# Patient Record
Sex: FEMALE | Race: WHITE | NOT HISPANIC OR LATINO | Employment: FULL TIME | URBAN - METROPOLITAN AREA
[De-identification: names, ages, dates, MRNs, and addresses within clinical notes are randomized per-mention and may not be internally consistent; named-entity substitution may affect disease eponyms.]

---

## 2017-04-05 ENCOUNTER — TELEPHONE (OUTPATIENT)
Dept: INTERNAL MEDICINE | Facility: CLINIC | Age: 22
End: 2017-04-05

## 2017-04-05 ENCOUNTER — OFFICE VISIT (OUTPATIENT)
Dept: INTERNAL MEDICINE | Facility: CLINIC | Age: 22
End: 2017-04-05
Payer: COMMERCIAL

## 2017-04-05 ENCOUNTER — LAB VISIT (OUTPATIENT)
Dept: LAB | Facility: HOSPITAL | Age: 22
End: 2017-04-05
Attending: INTERNAL MEDICINE
Payer: COMMERCIAL

## 2017-04-05 VITALS
OXYGEN SATURATION: 98 % | TEMPERATURE: 96 F | HEIGHT: 67 IN | DIASTOLIC BLOOD PRESSURE: 76 MMHG | HEART RATE: 52 BPM | SYSTOLIC BLOOD PRESSURE: 112 MMHG | WEIGHT: 150.81 LBS | BODY MASS INDEX: 23.67 KG/M2

## 2017-04-05 DIAGNOSIS — R42 DIZZINESS: ICD-10-CM

## 2017-04-05 DIAGNOSIS — R53.83 MALAISE AND FATIGUE: ICD-10-CM

## 2017-04-05 DIAGNOSIS — F32.A ANXIETY AND DEPRESSION: ICD-10-CM

## 2017-04-05 DIAGNOSIS — R53.81 MALAISE AND FATIGUE: ICD-10-CM

## 2017-04-05 DIAGNOSIS — Z00.00 ENCOUNTER FOR PREVENTIVE HEALTH EXAMINATION: Primary | ICD-10-CM

## 2017-04-05 DIAGNOSIS — F41.9 ANXIETY AND DEPRESSION: ICD-10-CM

## 2017-04-05 DIAGNOSIS — Z00.00 ENCOUNTER FOR PREVENTIVE HEALTH EXAMINATION: ICD-10-CM

## 2017-04-05 PROCEDURE — 36415 COLL VENOUS BLD VENIPUNCTURE: CPT | Mod: PO

## 2017-04-05 PROCEDURE — 80053 COMPREHEN METABOLIC PANEL: CPT

## 2017-04-05 PROCEDURE — 84443 ASSAY THYROID STIM HORMONE: CPT

## 2017-04-05 PROCEDURE — 83036 HEMOGLOBIN GLYCOSYLATED A1C: CPT

## 2017-04-05 PROCEDURE — 99385 PREV VISIT NEW AGE 18-39: CPT | Mod: S$GLB,,, | Performed by: INTERNAL MEDICINE

## 2017-04-05 PROCEDURE — 99999 PR PBB SHADOW E&M-NEW PATIENT-LVL III: CPT | Mod: PBBFAC,,, | Performed by: INTERNAL MEDICINE

## 2017-04-05 PROCEDURE — 80061 LIPID PANEL: CPT

## 2017-04-05 PROCEDURE — 85025 COMPLETE CBC W/AUTO DIFF WBC: CPT

## 2017-04-05 PROCEDURE — 82306 VITAMIN D 25 HYDROXY: CPT

## 2017-04-05 RX ORDER — FLUOXETINE HYDROCHLORIDE 20 MG/1
20 CAPSULE ORAL DAILY
Qty: 30 CAPSULE | Refills: 6 | Status: SHIPPED | OUTPATIENT
Start: 2017-04-05 | End: 2017-05-23 | Stop reason: SDUPTHER

## 2017-04-05 NOTE — PROGRESS NOTES
"Subjective:       Patient ID: Alea Cooley is a 22 y.o. female.    Chief Complaint: Establish Care    HPI Comments: Here to establish care.  Used to take prozac and klonopin for anxiety/depression.  Feels fatigued lately.  Sometimes feels dizzy and tired after eating.  Walking regularly.  No f/c/sw/cough.  No cp/palp.  Sob attrib to anxiety.  BMs sometimes slow, occas hard.  Urine normal.  Not taking vit D.  Menses regular, not excessively heavy.    SH:  No tob, occas Et[employer], single,  at [employer].    FH:  Mom skin cancer, gparents with DM.    HM:  5/11 TDaP.        Review of Systems   Constitutional: Positive for fatigue. Negative for appetite change, chills, diaphoresis and fever.   HENT: Negative for congestion, ear pain, rhinorrhea, sinus pressure and sore throat.    Respiratory: Negative for cough, chest tightness and shortness of breath.    Cardiovascular: Negative for chest pain and palpitations.   Gastrointestinal: Negative for blood in stool, constipation, diarrhea, nausea and vomiting.   Genitourinary: Negative for dysuria, frequency, hematuria, menstrual problem, urgency and vaginal discharge.   Musculoskeletal: Negative for arthralgias.   Skin: Negative for rash.   Neurological: Positive for dizziness. Negative for headaches.   Psychiatric/Behavioral: Negative for sleep disturbance. The patient is nervous/anxious.        Objective:   /76 (BP Location: Right arm)  Pulse (!) 52  Temp (!) 95.6 °F (35.3 °C) (Tympanic)   Ht 5' 7" (1.702 m)  Wt 68.4 kg (150 lb 12.7 oz)  SpO2 98%  BMI 23.62 kg/m2    Physical Exam   Constitutional: She is oriented to person, place, and time. She appears well-developed and well-nourished.   HENT:   Right Ear: External ear normal. Tympanic membrane is not injected.   Left Ear: External ear normal. Tympanic membrane is not injected.   Mouth/Throat: Oropharynx is clear and moist.   Eyes: Conjunctivae are normal.   Neck: Normal range of motion. Neck supple. No " thyromegaly present.   Cardiovascular: Normal rate, regular rhythm and intact distal pulses.  Exam reveals no gallop and no friction rub.    No murmur heard.  Pulmonary/Chest: Effort normal and breath sounds normal. She has no wheezes. She has no rales.   Abdominal: Soft. Bowel sounds are normal. She exhibits no mass. There is no tenderness.   Musculoskeletal: She exhibits no edema.   Lymphadenopathy:     She has no cervical adenopathy.   Neurological: She is alert and oriented to person, place, and time.   Skin: Skin is warm. No rash noted.   Psychiatric: She has a normal mood and affect.       Assessment:       1. Encounter for preventive health examination    2. Anxiety and depression    3. Malaise and fatigue    4. Dizziness        Plan:       Alea was seen today for establish care.    Diagnoses and all orders for this visit:    Encounter for preventive health examination- labs with DC.  -     Comprehensive metabolic panel; Future  -     Lipid panel; Future  -     CBC auto differential; Future  -     TSH; Future  -     Vitamin D; Future  -     Hemoglobin A1c; Future    Anxiety and depression- start rx.  -     fluoxetine (PROZAC) 20 MG capsule; Take 1 capsule (20 mg total) by mouth once daily.    Malaise and fatigue/Dizziness- likely 2ary to above.    RTC 6 weeks.

## 2017-04-05 NOTE — TELEPHONE ENCOUNTER
----- Message from Maliha Jeong sent at 4/5/2017  3:05 PM CDT -----  Contact: pt  Returning missed call - please call again - she will only be available for 10 min's and then not until after 4:20

## 2017-04-05 NOTE — MR AVS SNAPSHOT
[facility] Medicine  [address]  [place] [address]  Phone: [phone]  Fax: [phone]                  Alea Cooley   2017 5:00 PM   Office Visit    Description:  Female : 1995   Provider:  Alisha Ramirez MD   Department:  [facility] Internal Medicine           Reason for Visit     Establish Care           Diagnoses this Visit        Comments    Encounter for preventive health examination    -  Primary     Anxiety and depression         Malaise and fatigue         Dizziness                To Do List           Future Appointments        Provider Department Dept Phone    2017 6:30 PM LAB, SAME DAY [facility] [phone]    2017 4:40 PM Alisha Ramirez MD [facility] [phone]      Goals (5 Years of Data)     None      Follow-Up and Disposition     Return in about 6 weeks (around 2017).    Follow-up and Disposition History       These Medications        Disp Refills Start End    fluoxetine (PROZAC) 20 MG capsule 30 capsule 6 2017    Take 1 capsule (20 mg total) by mouth once daily. - Oral    Pharmacy: [address] #: [phone]         [facility]     [facility] On [facility] Nurse Care Line -  Assistance  Unless otherwise directed by your provider, please contact [facility], our nurse care line that is available for  assistance.     Registered nurses in the [facility] Center provide: appointment scheduling, clinical advisement, health education, and other advisory services.  Call: [phone] (toll free)               Medications           START taking these NEW medications        Refills    fluoxetine (PROZAC) 20 MG capsule 6    Sig: Take 1 capsule (20 mg total) by mouth once daily.    Class: Normal    Route: Oral           Verify that the below list of medications is an accurate representation of the medications you  "are currently taking.  If none reported, the list may be blank. If incorrect, please contact your healthcare provider. Carry this list with you in case of emergency.           Current Medications     multivitamin capsule Take 1 capsule by mouth once daily.    fluoxetine (PROZAC) 20 MG capsule Take 1 capsule (20 mg total) by mouth once daily.           Clinical Reference Information           Your Vitals Were     BP Pulse Temp Height Weight SpO2    112/76 (BP Location: Right arm) 52 95.6 °F (35.3 °C) (Tympanic) 5' 7" (1.702 m) 68.4 kg (150 lb 12.7 oz) 98%    BMI                23.62 kg/m2          Blood Pressure          Most Recent Value    BP  112/76      Allergies as of 4/5/2017     No Known Allergies      Immunizations Administered on Date of Encounter - 4/5/2017     None      Orders Placed During Today's Visit     Future Labs/Procedures Expected by Expires    CBC auto differential  4/5/2017 4/5/2018    Comprehensive metabolic panel  4/5/2017 4/5/2018    Hemoglobin A1c  4/5/2017 6/4/2018    Lipid panel  4/5/2017 4/5/2018    TSH  4/5/2017 4/5/2018    Vitamin D  As directed 4/5/2018      Language Assistance Services     ATTENTION: Language assistance services are available, free of charge. Please call [phone].      ATENCIÓN: Si javi waleska, tiene a  disposición servicios gratuitos de asistencia lingüística. Llame al [phone].     [facility] Ý: N?u b?n nói Ti?ng Vi?t, có các d?ch v? h? tr? ngôn ng? mi?n phí dành cho b?n. G?i s? [phone].         [facility] - Internal [facility] complies with applicable Federal civil rights laws and does not discriminate on the basis of race, color, national origin, age, disability, or sex.        "

## 2017-04-06 LAB
25(OH)D3+25(OH)D2 SERPL-MCNC: 32 NG/ML
ALBUMIN SERPL BCP-MCNC: 4 G/DL
ALP SERPL-CCNC: 39 U/L
ALT SERPL W/O P-5'-P-CCNC: 12 U/L
ANION GAP SERPL CALC-SCNC: 8 MMOL/L
AST SERPL-CCNC: 23 U/L
BASOPHILS # BLD AUTO: 0.01 K/UL
BASOPHILS NFR BLD: 0.2 %
BILIRUB SERPL-MCNC: 0.3 MG/DL
BUN SERPL-MCNC: 16 MG/DL
CALCIUM SERPL-MCNC: 9.6 MG/DL
CHLORIDE SERPL-SCNC: 105 MMOL/L
CHOLEST/HDLC SERPL: 3.4 {RATIO}
CO2 SERPL-SCNC: 25 MMOL/L
CREAT SERPL-MCNC: 0.8 MG/DL
DIFFERENTIAL METHOD: NORMAL
EOSINOPHIL # BLD AUTO: 0.1 K/UL
EOSINOPHIL NFR BLD: 1.1 %
ERYTHROCYTE [DISTWIDTH] IN BLOOD BY AUTOMATED COUNT: 13.6 %
EST. GFR  (AFRICAN AMERICAN): >60 ML/MIN/1.73 M^2
EST. GFR  (NON AFRICAN AMERICAN): >60 ML/MIN/1.73 M^2
GLUCOSE SERPL-MCNC: 72 MG/DL
HCT VFR BLD AUTO: 37 %
HDL/CHOLESTEROL RATIO: 29.7 %
HDLC SERPL-MCNC: 175 MG/DL
HDLC SERPL-MCNC: 52 MG/DL
HGB BLD-MCNC: 12.3 G/DL
LDLC SERPL CALC-MCNC: 112 MG/DL
LYMPHOCYTES # BLD AUTO: 2.5 K/UL
LYMPHOCYTES NFR BLD: 47.5 %
MCH RBC QN AUTO: 27.5 PG
MCHC RBC AUTO-ENTMCNC: 33.2 %
MCV RBC AUTO: 83 FL
MONOCYTES # BLD AUTO: 0.4 K/UL
MONOCYTES NFR BLD: 7.6 %
NEUTROPHILS # BLD AUTO: 2.3 K/UL
NEUTROPHILS NFR BLD: 43.4 %
NONHDLC SERPL-MCNC: 123 MG/DL
PLATELET # BLD AUTO: 203 K/UL
PMV BLD AUTO: 11.9 FL
POTASSIUM SERPL-SCNC: 4.1 MMOL/L
PROT SERPL-MCNC: 7.3 G/DL
RBC # BLD AUTO: 4.47 M/UL
SODIUM SERPL-SCNC: 138 MMOL/L
TRIGL SERPL-MCNC: 55 MG/DL
TSH SERPL DL<=0.005 MIU/L-ACNC: 0.61 UIU/ML
WBC # BLD AUTO: 5.28 K/UL

## 2017-04-07 LAB
ESTIMATED AVG GLUCOSE: 103 MG/DL
HBA1C MFR BLD HPLC: 5.2 %

## 2017-04-20 ENCOUNTER — TELEPHONE (OUTPATIENT)
Dept: INTERNAL MEDICINE | Facility: CLINIC | Age: 22
End: 2017-04-20

## 2017-04-20 NOTE — TELEPHONE ENCOUNTER
----- Message from Nicole Villatoro sent at 4/20/2017  3:50 PM CDT -----  Contact: pt   States she needs a copy of her tetanus shot records faxed to [phone] pt's atten on it and can be reached at [phone]//thanks/dbw

## 2017-05-23 ENCOUNTER — OFFICE VISIT (OUTPATIENT)
Dept: INTERNAL MEDICINE | Facility: CLINIC | Age: 22
End: 2017-05-23
Payer: COMMERCIAL

## 2017-05-23 VITALS
BODY MASS INDEX: 22.98 KG/M2 | HEIGHT: 67 IN | DIASTOLIC BLOOD PRESSURE: 62 MMHG | TEMPERATURE: 97 F | HEART RATE: 75 BPM | SYSTOLIC BLOOD PRESSURE: 114 MMHG | WEIGHT: 146.38 LBS

## 2017-05-23 DIAGNOSIS — F41.9 ANXIETY AND DEPRESSION: Primary | ICD-10-CM

## 2017-05-23 DIAGNOSIS — R53.83 MALAISE AND FATIGUE: ICD-10-CM

## 2017-05-23 DIAGNOSIS — Z00.00 ENCOUNTER FOR PREVENTIVE HEALTH EXAMINATION: ICD-10-CM

## 2017-05-23 DIAGNOSIS — F32.A ANXIETY AND DEPRESSION: Primary | ICD-10-CM

## 2017-05-23 DIAGNOSIS — R53.81 MALAISE AND FATIGUE: ICD-10-CM

## 2017-05-23 PROCEDURE — 99213 OFFICE O/P EST LOW 20 MIN: CPT | Mod: S$GLB,,, | Performed by: INTERNAL MEDICINE

## 2017-05-23 PROCEDURE — 1160F RVW MEDS BY RX/DR IN RCRD: CPT | Mod: S$GLB,,, | Performed by: INTERNAL MEDICINE

## 2017-05-23 PROCEDURE — 99999 PR PBB SHADOW E&M-EST. PATIENT-LVL II: CPT | Mod: PBBFAC,,, | Performed by: INTERNAL MEDICINE

## 2017-05-23 RX ORDER — FLUOXETINE HYDROCHLORIDE 20 MG/1
20 CAPSULE ORAL DAILY
Qty: 30 CAPSULE | Refills: 6 | Status: SHIPPED | OUTPATIENT
Start: 2017-05-23 | End: 2018-07-02 | Stop reason: SDUPTHER

## 2017-05-23 NOTE — PROGRESS NOTES
"Subjective:       Patient ID: Alea Cooley is a 22 y.o. female.    Chief Complaint: Follow-up    Here for follow up of medical problems.  Doing very well on rx.  Thinks dose is good.  No more dizziness after 3-4 weeks of rx.  Walking for exercise.  Sleeping well.  Energy much better now.    Updated/ annual due 4/18:  HM:  5/11 TDaP.          Review of Systems   Constitutional: Negative for chills, diaphoresis and fever.   Respiratory: Negative for cough and shortness of breath.    Cardiovascular: Negative for chest pain, palpitations and leg swelling.   Gastrointestinal: Negative for blood in stool, constipation, diarrhea, nausea and vomiting.   Genitourinary: Negative for dysuria, frequency and hematuria.   Psychiatric/Behavioral: The patient is not nervous/anxious.        Objective:   /62   Pulse 75   Temp 96.9 °F (36.1 °C) (Tympanic)   Ht 5' 7" (1.702 m)   Wt 66.4 kg (146 lb 6.2 oz)   BMI 22.93 kg/m²     Physical Exam   Constitutional: She is oriented to person, place, and time. She appears well-developed.   HENT:   Mouth/Throat: Oropharynx is clear and moist.   Neck: Neck supple. Carotid bruit is not present. No thyroid mass present.   Cardiovascular: Normal rate, regular rhythm and intact distal pulses.  Exam reveals no gallop and no friction rub.    No murmur heard.  Pulmonary/Chest: Effort normal and breath sounds normal. She has no wheezes. She has no rales.   Abdominal: Soft. Bowel sounds are normal. She exhibits no mass. There is no hepatosplenomegaly. There is no tenderness.   Musculoskeletal: She exhibits no edema.   Lymphadenopathy:     She has no cervical adenopathy.   Neurological: She is alert and oriented to person, place, and time.   Psychiatric: She has a normal mood and affect.       Assessment:       1. Anxiety and depression        Plan:       Alea was seen today for follow-up.    Diagnoses and all orders for this visit:    Anxiety and depression- cont rx.  RTC 4mo.           "

## 2017-07-14 ENCOUNTER — PATIENT MESSAGE (OUTPATIENT)
Dept: INTERNAL MEDICINE | Facility: CLINIC | Age: 22
End: 2017-07-14

## 2017-07-31 ENCOUNTER — TELEPHONE (OUTPATIENT)
Dept: INTERNAL MEDICINE | Facility: CLINIC | Age: 22
End: 2017-07-31

## 2017-07-31 NOTE — TELEPHONE ENCOUNTER
Patient called and stated that she was feeling suicidal. I asked her where she was and she stated she was at work. She works at [employer] and she was in the office with her supervisor at the time. I then asked her how long has she been feeling this way and she said since yesterday. She says that she just doesn't want to be here anymore. She stated she was in the office with her supervisor and then put her on the phone. Her supervisor Bessie stated that she came into her office saying that she was having thoughts of suicide and she thought to call Dr Ramirez's office. I told her that she should not leave her alone at any point. She said that she was sitting in the office with her. I told her that she needs to go to [facility] and see the [facility] team to help her. The supervisor suggested that she bring herself. Told her not to let her leave by herself or let her drive. I asked if anyone can bring her from the office and she stated no. I then told her I can call EMS to bring her and she said the patient did not want that nor did she. Rosanna was with me while I was on the call with this patient and she looked in her chart and called her mom Ms Montelongo. Rosanna explained what was going on and asked if she will go and  there daughter and bring her to the [facility] ER. Mom said that she would and I relayed that message to Bessie and also gave her Alea's mom's cell phone number so that she can call her and give directions to exactly where she is. Bessie stated she will call the mom and stay with her until she arrives to bring her to the ER.     Very good advice and thank you.  SM

## 2017-10-17 ENCOUNTER — OFFICE VISIT (OUTPATIENT)
Dept: INTERNAL MEDICINE | Facility: CLINIC | Age: 22
End: 2017-10-17
Payer: COMMERCIAL

## 2017-10-17 VITALS
OXYGEN SATURATION: 98 % | WEIGHT: 148.5 LBS | BODY MASS INDEX: 23.31 KG/M2 | HEIGHT: 67 IN | SYSTOLIC BLOOD PRESSURE: 114 MMHG | DIASTOLIC BLOOD PRESSURE: 66 MMHG | TEMPERATURE: 97 F | HEART RATE: 53 BPM

## 2017-10-17 DIAGNOSIS — J32.9 SINUSITIS, UNSPECIFIED CHRONICITY, UNSPECIFIED LOCATION: ICD-10-CM

## 2017-10-17 DIAGNOSIS — R51.9 SINUS HEADACHE: Primary | ICD-10-CM

## 2017-10-17 PROCEDURE — 99214 OFFICE O/P EST MOD 30 MIN: CPT | Mod: S$GLB,,, | Performed by: NURSE PRACTITIONER

## 2017-10-17 PROCEDURE — 99999 PR PBB SHADOW E&M-EST. PATIENT-LVL III: CPT | Mod: PBBFAC,,, | Performed by: NURSE PRACTITIONER

## 2017-10-17 RX ORDER — NAPROXEN SODIUM 220 MG
220 TABLET ORAL
COMMUNITY
End: 2018-01-22

## 2017-10-17 RX ORDER — AZITHROMYCIN 250 MG/1
250 TABLET, FILM COATED ORAL DAILY
Qty: 6 TABLET | Refills: 0 | Status: SHIPPED | OUTPATIENT
Start: 2017-10-17 | End: 2017-10-22

## 2017-10-17 RX ORDER — CETIRIZINE HYDROCHLORIDE 10 MG/1
10 TABLET ORAL DAILY
Qty: 14 TABLET | Refills: 0 | Status: SHIPPED | OUTPATIENT
Start: 2017-10-17 | End: 2018-01-22

## 2017-10-17 NOTE — LETTER
October 17, 2017               [facility] - Internal Medicine  Internal Medicine  [address]  [place] [address]  Phone: [phone]  Fax: [phone]   October 17, 2017     Patient: Alea Cooley   YOB: 1995   Date of Visit: 10/17/2017       To Whom it May Concern:    Alea Cooley was seen in my clinic on 10/17/2017. She may return to work on 10/18/2017.    If you have any questions or concerns, please don't hesitate to call.    Sincerely,         Klaudia Mayes NP

## 2017-10-19 NOTE — PROGRESS NOTES
"CHIEF COMPLAINT/REASON FOR VISIT: nasal congestion, post nasal drip, sore throat, facial pressure    HISTORY OF PRESENT ILLNESS:    Alea Cooley.  22 y.o.  female complains of a sinus issue with nasal congestion, headache, facial pressure, bilateral ear fullness onset 13 days (1 1/2 week) ago. Patient admits tried OTC medications with little relief. Reports having sinus congestion about one week ago.      PAST MEDICAL HISTORY:   Past Medical History:   Diagnosis Date    Anxiety and depression          PAST SURGICAL HISTORY:.History reviewed. No pertinent surgical history.      SOCIAL HISTORY:.  Social History     Social History    Marital status: Single     Spouse name: N/A    Number of children: N/A    Years of education: N/A     Occupational History    Not on file.     Social History Main Topics    Smoking status: Never Smoker    Smokeless tobacco: Never Used    Alcohol use Yes    Drug use: Unknown    Sexual activity: Not on file     Other Topics Concern    Not on file     Social History Narrative    No narrative on file       ROS:  GENERAL: Reports no fever, chills.  SKIN: No rashes, itching or changes in color or texture of skin.  HEENT: Reports sinus pressure, nasal congestion, ear discomfort, rhinorrhea.  CHEST: Denies cough and sputum production.  Denies shortness of breath and wheezing.  CARDIOVASCULAR: Denies chest pain, PND, orthopnea or reduced exercise tolerance.  ABDOMEN: Appetite fine. No weight loss.   MUSCULOSKELETAL: No joint stiffness, pain or swelling. Denies back pain.  NEUROLOGIC: Report headaches. No history of seizures, paralysis, alteration of gait or coordination.  PSYCHIATRIC: Denies mood swings, depression or suicidal thoughts.    /66 (BP Location: Left arm, Patient Position: Sitting, BP Method: Medium (Manual))   Pulse (!) 53   Temp 97.2 °F (36.2 °C) (Tympanic)   Ht 5' 7" (1.702 m)   Wt 67.4 kg (148 lb 7.7 oz)   LMP 10/17/2017 (Exact Date)   SpO2 98%   BMI 23.26 " kg/m² .    PE:   APPEARANCE: Well nourished, well developed, in mild distress.   SKIN: Normal skin turgor, no lesions.  HEENT: Turbinates red and enlarge, sinus tenderness on palpation, normal pharynx, TMs poor light reflex bilaterally.  CHEST: Lungs clear to auscultation. no wheezing  CARDIOVASCULAR: Regular rate and rhythm.  MUSCULOSKELETAL: Motor: 5/5 strength major flexors/extensors.    PLAN:   Advise Hydrate and rest.    Normal saline nasal wash  to irrigate sinuses and for congestion/runny nose.  Practice good handwashing.  Mucinex for cough and chest congestion.  Tylenol or Ibuprofen for fever, headache and body aches      DIAGNOSIS:  Sinus headache  -     azithromycin (ZITHROMAX Z-MARIO) 250 MG tablet; Take 1 tablet (250 mg total) by mouth once daily. Take 2 tabs today then one a day  Dispense: 6 tablet; Refill: 0  -     cetirizine (ZYRTEC) 10 MG tablet; Take 1 tablet (10 mg total) by mouth once daily.  Dispense: 14 tablet; Refill: 0    Sinusitis, unspecified chronicity, unspecified location  -     azithromycin (ZITHROMAX Z-MARIO) 250 MG tablet; Take 1 tablet (250 mg total) by mouth once daily. Take 2 tabs today then one a day  Dispense: 6 tablet; Refill: 0  -     cetirizine (ZYRTEC) 10 MG tablet; Take 1 tablet (10 mg total) by mouth once daily.  Dispense: 14 tablet; Refill: 0        Instructed to take all medications as ordered.  Informed if no improvement or symptoms worsens to return to clinic.  If not, follow up as scheduled.  Informed to hydrate and rest.  Printed and review after visit summary with patient.

## 2018-01-22 ENCOUNTER — OFFICE VISIT (OUTPATIENT)
Dept: INTERNAL MEDICINE | Facility: CLINIC | Age: 23
End: 2018-01-22
Payer: COMMERCIAL

## 2018-01-22 ENCOUNTER — LAB VISIT (OUTPATIENT)
Dept: LAB | Facility: HOSPITAL | Age: 23
End: 2018-01-22
Attending: PHYSICIAN ASSISTANT
Payer: COMMERCIAL

## 2018-01-22 VITALS
BODY MASS INDEX: 23.43 KG/M2 | WEIGHT: 149.25 LBS | TEMPERATURE: 99 F | HEART RATE: 68 BPM | SYSTOLIC BLOOD PRESSURE: 110 MMHG | OXYGEN SATURATION: 98 % | DIASTOLIC BLOOD PRESSURE: 76 MMHG | HEIGHT: 67 IN

## 2018-01-22 DIAGNOSIS — Z12.4 PAP SMEAR FOR CERVICAL CANCER SCREENING: ICD-10-CM

## 2018-01-22 DIAGNOSIS — R10.2 SUPRAPUBIC PAIN: ICD-10-CM

## 2018-01-22 DIAGNOSIS — R10.2 SUPRAPUBIC PAIN: Primary | ICD-10-CM

## 2018-01-22 DIAGNOSIS — N30.00 ACUTE CYSTITIS WITHOUT HEMATURIA: Primary | ICD-10-CM

## 2018-01-22 LAB
AMORPH CRY URNS QL MICRO: ABNORMAL
BILIRUB UR QL STRIP: NEGATIVE
CLARITY UR: ABNORMAL
COLOR UR: YELLOW
GLUCOSE UR QL STRIP: NEGATIVE
HGB UR QL STRIP: NEGATIVE
KETONES UR QL STRIP: NEGATIVE
LEUKOCYTE ESTERASE UR QL STRIP: NEGATIVE
MICROSCOPIC COMMENT: ABNORMAL
NITRITE UR QL STRIP: NEGATIVE
PH UR STRIP: 7 [PH] (ref 5–8)
PROT UR QL STRIP: NEGATIVE
SP GR UR STRIP: 1.01 (ref 1–1.03)
SQUAMOUS #/AREA URNS HPF: 6 /HPF
URN SPEC COLLECT METH UR: ABNORMAL

## 2018-01-22 PROCEDURE — 99999 PR PBB SHADOW E&M-EST. PATIENT-LVL IV: CPT | Mod: PBBFAC,,, | Performed by: PHYSICIAN ASSISTANT

## 2018-01-22 PROCEDURE — 87086 URINE CULTURE/COLONY COUNT: CPT

## 2018-01-22 PROCEDURE — 81000 URINALYSIS NONAUTO W/SCOPE: CPT | Mod: PO

## 2018-01-22 PROCEDURE — 99213 OFFICE O/P EST LOW 20 MIN: CPT | Mod: S$GLB,,, | Performed by: PHYSICIAN ASSISTANT

## 2018-01-22 RX ORDER — NITROFURANTOIN 25; 75 MG/1; MG/1
100 CAPSULE ORAL 2 TIMES DAILY
Qty: 14 CAPSULE | Refills: 0 | Status: SHIPPED | OUTPATIENT
Start: 2018-01-22 | End: 2018-01-29

## 2018-01-22 RX ORDER — PHENAZOPYRIDINE HYDROCHLORIDE 100 MG/1
100 TABLET, FILM COATED ORAL 3 TIMES DAILY PRN
Qty: 15 TABLET | Refills: 0 | Status: SHIPPED | OUTPATIENT
Start: 2018-01-22 | End: 2018-01-27

## 2018-01-22 NOTE — PROGRESS NOTES
Subjective:      Patient ID: Alea Cooley is a 22 y.o. female.    Chief Complaint: Pelvic Pain and Urinary Tract Infection    LMP: 12/24/2017.       Abdominal Pain   This is a recurrent problem. The current episode started more than 1 month ago. The onset quality is sudden. The problem occurs intermittently. The most recent episode lasted 3 hours. The problem has been gradually worsening. The pain is located in the suprapubic region. The pain is at a severity of 7/10. The pain is mild. The quality of the pain is cramping. Associated symptoms include constipation, diarrhea, flatus, frequency, headaches, myalgias and nausea. Pertinent negatives include no anorexia, arthralgias, belching, dysuria, fever, hematochezia, hematuria, melena, vomiting or weight loss. The pain is aggravated by drinking alcohol (sexual intercourse). The pain is relieved by certain positions. She has tried nothing for the symptoms. The treatment provided mild relief. There is no history of abdominal surgery, colon cancer, Crohn's disease, gallstones, GERD, irritable bowel syndrome, pancreatitis, PUD or ulcerative colitis. Patient's medical history includes UTI. Patient's medical history does not include kidney stones.   Has never seen GYN. Will get established today.    Review of Systems   Constitutional: Negative for activity change, appetite change, chills, diaphoresis, fatigue, fever, unexpected weight change and weight loss.   HENT: Negative.  Negative for congestion, hearing loss, postnasal drip, rhinorrhea, sore throat, trouble swallowing and voice change.    Eyes: Negative.  Negative for visual disturbance.   Respiratory: Negative.  Negative for cough, choking, chest tightness and shortness of breath.    Cardiovascular: Negative for chest pain, palpitations and leg swelling.   Gastrointestinal: Positive for abdominal pain, constipation, diarrhea, flatus and nausea. Negative for abdominal distention, anorexia, blood in stool,  "hematochezia, melena and vomiting.   Endocrine: Negative for cold intolerance, heat intolerance, polydipsia and polyuria.   Genitourinary: Positive for frequency. Negative for difficulty urinating, dysuria and hematuria.   Musculoskeletal: Positive for myalgias. Negative for arthralgias, back pain, gait problem and joint swelling.   Skin: Negative for color change, pallor, rash and wound.   Neurological: Positive for headaches. Negative for dizziness, tremors, weakness, light-headedness and numbness.   Hematological: Negative for adenopathy.   Psychiatric/Behavioral: Negative for behavioral problems, confusion, self-injury, sleep disturbance and suicidal ideas. The patient is not nervous/anxious.      Objective:   /76   Pulse 68   Temp 98.5 °F (36.9 °C) (Tympanic)   Ht 5' 7" (1.702 m)   Wt 67.7 kg (149 lb 4 oz)   LMP 12/24/2017 (Exact Date)   SpO2 98%   BMI 23.38 kg/m²     Physical Exam   Constitutional: She is oriented to person, place, and time. She appears well-developed and well-nourished.   HENT:   Head: Normocephalic and atraumatic.   Right Ear: External ear normal.   Left Ear: External ear normal.   Nose: Nose normal.   Mouth/Throat: Oropharynx is clear and moist.   Eyes: Conjunctivae and EOM are normal. Pupils are equal, round, and reactive to light.   Neck: Normal range of motion.   Cardiovascular: Normal rate, regular rhythm and normal heart sounds.  Exam reveals no gallop and no friction rub.    No murmur heard.  Pulmonary/Chest: Effort normal and breath sounds normal. No respiratory distress. She has no wheezes. She has no rales. She exhibits no tenderness.   Abdominal: Soft. Bowel sounds are normal. She exhibits no distension, no pulsatile liver, no fluid wave, no ascites, no pulsatile midline mass and no mass. There is no hepatosplenomegaly, splenomegaly or hepatomegaly. There is tenderness in the suprapubic area. There is no rigidity, no rebound, no guarding and no CVA tenderness. "   Musculoskeletal: Normal range of motion.   Neurological: She is alert and oriented to person, place, and time.   Skin: Skin is warm. No rash noted.   Psychiatric: She has a normal mood and affect. Her behavior is normal. Judgment and thought content normal.   Vitals reviewed.      Assessment:     1. Suprapubic pain    2. Pap smear for cervical cancer screening      Plan:   Suprapubic pain  -     Urinalysis; Future; Expected date: 01/22/2018  -     Urine culture; Future  -drink at least 100 ounces of water daily. Reduce sugary beverages.   -fiber to prevent constipation. Other prevention tips discussed. Handout provided.     Pap smear for cervical cancer screening  -     Ambulatory referral to Gynecology    Follow-up if symptoms worsen or fail to improve.

## 2018-01-22 NOTE — PATIENT INSTRUCTIONS

## 2018-01-24 LAB — BACTERIA UR CULT: NORMAL

## 2018-01-26 ENCOUNTER — OFFICE VISIT (OUTPATIENT)
Dept: OBSTETRICS AND GYNECOLOGY | Facility: CLINIC | Age: 23
End: 2018-01-26
Payer: COMMERCIAL

## 2018-01-26 VITALS
BODY MASS INDEX: 23.98 KG/M2 | DIASTOLIC BLOOD PRESSURE: 60 MMHG | HEIGHT: 67 IN | WEIGHT: 152.75 LBS | SYSTOLIC BLOOD PRESSURE: 108 MMHG

## 2018-01-26 DIAGNOSIS — N92.6 IRREGULAR MENSES: ICD-10-CM

## 2018-01-26 DIAGNOSIS — Z30.011 ENCOUNTER FOR INITIAL PRESCRIPTION OF CONTRACEPTIVE PILLS: ICD-10-CM

## 2018-01-26 DIAGNOSIS — N94.10 DYSPAREUNIA, FEMALE: ICD-10-CM

## 2018-01-26 DIAGNOSIS — N39.0 FREQUENT UTI: ICD-10-CM

## 2018-01-26 DIAGNOSIS — Z01.419 ENCOUNTER FOR GYNECOLOGICAL EXAMINATION WITHOUT ABNORMAL FINDING: Primary | ICD-10-CM

## 2018-01-26 PROCEDURE — 99385 PREV VISIT NEW AGE 18-39: CPT | Mod: 25,S$GLB,, | Performed by: NURSE PRACTITIONER

## 2018-01-26 PROCEDURE — 99999 PR PBB SHADOW E&M-EST. PATIENT-LVL III: CPT | Mod: PBBFAC,,, | Performed by: NURSE PRACTITIONER

## 2018-01-26 PROCEDURE — 81025 URINE PREGNANCY TEST: CPT | Mod: S$GLB,,, | Performed by: NURSE PRACTITIONER

## 2018-01-26 PROCEDURE — 87491 CHLMYD TRACH DNA AMP PROBE: CPT

## 2018-01-26 PROCEDURE — 88175 CYTOPATH C/V AUTO FLUID REDO: CPT

## 2018-01-26 RX ORDER — LEVONORGESTREL AND ETHINYL ESTRADIOL 0.1-0.02MG
1 KIT ORAL DAILY
Qty: 28 TABLET | Refills: 12 | Status: SHIPPED | OUTPATIENT
Start: 2018-01-26 | End: 2018-09-11 | Stop reason: SDUPTHER

## 2018-01-26 NOTE — PROGRESS NOTES
"CC: Well woman exam    Alea Cooley is a 22 y.o. female  presents for well woman exam.  LMP: Patient's last menstrual period was 2017..    Cycles are now coming about q 6-7 weeks - made a change about 6 mth ago.   Takes a pregnancy test to make sure negative.  Has not taken one this month.  Wants to start ocp.  She has had frequent uti's.  Feeling better with treatment.  Noted had amorphous changes in urine.  Pt eats a lot of citrus fruits.  Only urinates after intercourse.  First pap - teaching done.   Has noted some pain with intercourse in vaginal area over last few attempts.     Past Medical History:   Diagnosis Date    Anxiety and depression      History reviewed. No pertinent surgical history.  Social History     Social History    Marital status: Single     Spouse name: N/A    Number of children: N/A    Years of education: N/A     Occupational History    Not on file.     Social History Main Topics    Smoking status: Never Smoker    Smokeless tobacco: Never Used    Alcohol use Yes      Comment: occ    Drug use: No    Sexual activity: Yes     Partners: Male     Birth control/ protection: None     Other Topics Concern    Not on file     Social History Narrative    No narrative on file     Family History   Problem Relation Age of Onset    Diabetes Paternal Grandfather     Diabetes Paternal Grandmother     Diabetes Maternal Grandmother      OB History      Para Term  AB Living    0 0 0 0 0 0    SAB TAB Ectopic Multiple Live Births    0 0 0 0 0          /60   Ht 5' 7" (1.702 m)   Wt 69.3 kg (152 lb 12.5 oz)   LMP 2017   BMI 23.93 kg/m²       ROS:  GENERAL: Denies weight gain or weight loss. Feeling well overall.   SKIN: Denies rash or lesions.   HEAD: Denies head injury or headache.   NODES: Denies enlarged lymph nodes.   CHEST: Denies chest pain or shortness of breath.   CARDIOVASCULAR: Denies palpitations or left sided chest pain.   ABDOMEN: No abdominal " pain, constipation, diarrhea, nausea, vomiting or rectal bleeding.   URINARY: No frequency, dysuria, hematuria, or burning on urination.  REPRODUCTIVE: See HPI.   BREASTS: The patient performs breast self-examination and denies pain, lumps, or nipple discharge.   HEMATOLOGIC: No easy bruisability or excessive bleeding.   MUSCULOSKELETAL: Denies joint pain or swelling.   NEUROLOGIC: Denies syncope or weakness.   PSYCHIATRIC: Denies depression, anxiety or mood swings.    PHYSICAL EXAM:  APPEARANCE: Well nourished, well developed, in no acute distress.  AFFECT: WNL, alert and oriented x 3  SKIN: No acne or hirsutism  NECK: Neck symmetric without masses or thyromegaly  NODES: No inguinal, cervical, axillary, or femoral lymph node enlargement  CHEST: Good respiratory effect  ABDOMEN: Soft.  No tenderness or masses.  No hepatosplenomegaly.  No hernias.  BREASTS: Symmetrical, no skin changes or visible lesions.  No palpable masses, nipple discharge bilaterally.  PELVIC: Normal external genitalia without lesions.  Normal hair distribution.  Adequate perineal body, normal urethral meatus.  Vagina moist and well rugated without lesions or discharge.  Cervix pink, without lesions, discharge or tenderness.  No significant cystocele or rectocele.  Bimanual exam shows uterus to be normal size, regular, mobile and nontender.  Adnexa without masses or tenderness.    EXTREMITIES: No edema.  Physical Exam    1. Encounter for gynecological examination without abnormal finding  Liquid-based pap smear, screening   2. Irregular menses  POCT Urine Pregnancy   3. Encounter for initial prescription of contraceptive pills  levonorgestrel-ethinyl estradiol (AVIANE,ALESSE,LESSINA) 0.1-20 mg-mcg per tablet   4. Frequent UTI  C. trachomatis/N. gonorrhoeae by AMP DNA Cervix   5. Dyspareunia, female      AND PLAN:    Patient was counseled today on A.C.S. Pap guidelines and recommendations for yearly pelvic exams, mammograms and monthly self breast  exams; to see her PCP for other health maintenance.     uti triggers  Water in diet  Urination before and after intercourse  Use of lubrication with intercourse, controlled penetration   upt negative in office today  Teaching on ocp done   This was at least a 30 minute face to face visit not including the examine for teaching   If urine issues continue recommend see urology    The use of the oral contraceptive has been fully discussed with the patient. This includes the proper method to initiate and continue the pills, the need for regular compliance to ensure adequate contraceptive effect, the physiology which make the pill effective, the instructions for what to do in event of a missed pill, and warnings about anticipated minor side effects such as breakthrough spotting, nausea, breast tenderness, weight changes, acne, headaches, etc.  She has been told of the more serious potential side effects such as MI, stroke, and deep vein thrombosis, all of which are very unlikely.  She has been asked to report any signs of such serious problems immediately.  She should back up the pill with a condom during any cycle in which antibiotics are prescribed, and during the first cycle as well. The need for additional protection, such as a condom, to prevent exposure to sexually transmitted diseases has also been discussed- the patient has been clearly reminded that OCP's cannot protect her against diseases such as HIV and others. She understands and wishes to take the medication as prescribed.

## 2018-01-26 NOTE — PATIENT INSTRUCTIONS
"  How Birth Control Works  Birth control prevents pregnancy by preventing conception. Some methods prevent an egg from maturing. Some keep the sperm and egg from meeting. And some methods work in both ways.  Preventing ovulation  Certain hormones help prevent an egg from maturing and being released. Hormone methods include:  · Birth control pills  · Skin patches  · Contraceptive vaginal rings  · Injections  Preventing sperm and egg from meeting  Methods that prevent the sperm and egg from joining include:  · Barrier methods, such as the condom, the diaphragm, and the cervical cap  · Spermicide  · The IUD (intrauterine device)  · Sterilization  · Natural family planning  · Some types of hormone methods  Date Last Reviewed: 3/1/2017  © 8379-7527 [employer]. [address] [place] [address], [place] [address]. All rights reserved. This information is not intended as a substitute for professional medical care. Always follow your healthcare professional's instructions.        Bladder Infection, Female (Adult)    Urine is normally doesn't have any bacteria in it. But bacteria can get into the urinary tract from the skin around the rectum. Or they can travel in the blood from elsewhere in the body. Once they are in your urinary tract, they can cause infection in the urethra (urethritis), the bladder (cystitis), or the kidneys (pyelonephritis).  The most common place for an infection is in the bladder. This is called a bladder infection. This is one of the most common infections in women. Most bladder infections are easily treated. They are not serious unless the infection spreads to the kidney.  The phrases "bladder infection," "UTI," and "cystitis" are often used to describe the same thing. But they are not always the same. Cystitis is an inflammation of the bladder. The most common cause of cystitis is an infection.  Symptoms  The infection causes inflammation in the urethra and bladder. This causes many of the " symptoms. The most common symptoms of a bladder infection are:  · Pain or burning when urinating  · Having to urinate more often than usual  · Urgent need to urinate  · Only a small amount of urine comes out  · Blood in urine  · Abdominal discomfort. This is usually in the lower abdomen above the pubic bone.  · Cloudy urine  · Strong- or bad-smelling urine  · Unable to urinate (urinary retention)  · Unable to hold urine in (urinary incontinence)  · Fever  · Loss of appetite  · Confusion (in older adults)  Causes  Bladder infections are not contagious. You can't get one from someone else, from a toilet seat, or from sharing a bath.  The most common cause of bladder infections is bacteria from the bowels. The bacteria get onto the skin around the opening of the urethra. From there, they can get into the urine and travel up to the bladder, causing inflammation and infection. This usually happens because of:  · Wiping improperly after urinating. Always wipe from front to back.  · Bowel incontinence  · Pregnancy  · Procedures such as having a catheter inserted  · Older age  · Not emptying your bladder. This can allow bacteria a chance to grow in your urine.  · Dehydration  · Constipation  · Sex  · Use of a diaphragm for birth control   Treatment  Bladder infections are diagnosed by a urine test. They are treated with antibiotics and usually clear up quickly without complications. Treatment helps prevent a more serious kidney infection.  Medicines  Medicines can help in the treatment of a bladder infection:  · Take antibiotics until they are used up, even if you feel better. It is important to finish them to make sure the infection has cleared.  · You can use acetaminophen or ibuprofen for pain, fever, or discomfort, unless another medicine was prescribed. If you have chronic liver or kidney disease, talk with your healthcare provider before using these medicines. Also talk with your provider if you've ever had a stomach  ulcer or gastrointestinal bleeding, or are taking blood-thinner medicines.  · If you are given phenazopydridine to reduce burning with urination, it will cause your urine to become a bright orange color. This can stain clothing.  Care and prevention  These self-care steps can help prevent future infections:  · Drink plenty of fluids to prevent dehydration and flush out your bladder. Do this unless you must restrict fluids for other health reasons, or your doctor told you not to.  · Proper cleaning after going to the bathroom is important. Wipe from front to back after using the toilet to prevent the spread of bacteria.  · Urinate more often. Don't try to hold urine in for a long time.  · Wear loose-fitting clothes and cotton underwear. Avoid tight-fitting pants.  · Improve your diet and prevent constipation. Eat more fresh fruit and vegetables, and fiber, and less junk and fatty foods.  · Avoid sex until your symptoms are gone.  · Avoid caffeine, alcohol, and spicy foods. These can irritate your bladder.  · Urinate right after intercourse to flush out your bladder.  · If you use birth control pills and have frequent bladder infections, discuss it with your doctor.  Follow-up care  Call your healthcare provider if all symptoms are not gone after 3 days of treatment. This is especially important if you have repeat infections.  If a culture was done, you will be told if your treatment needs to be changed. If directed, you can call to find out the results.  If X-rays were done, you will be told if the results will affect your treatment.  Call 911  Call 911 if any of the following occur:  · Trouble breathing  · Hard to wake up or confusion  · Fainting or loss of consciousness  · Rapid heart rate  When to seek medical advice  Call your healthcare provider right away if any of these occur:  · Fever of 100.4ºF (38.0ºC) or higher, or as directed by your healthcare provider  · Symptoms are not better by the third day of  treatment  · Back or belly (abdominal) pain that gets worse  · Repeated vomiting, or unable to keep medicine down  · Weakness or dizziness  · Vaginal discharge  · Pain, redness, or swelling in the outer vaginal area (labia)  Date Last Reviewed: 10/1/2016  © 9064-1340 [employer]. [address] [place] [address]. All rights reserved. This information is not intended as a substitute for professional medical care. Always follow your healthcare professional's instructions.        The Range of Pap Test Results  When your Pap test is sent to the lab, the lab studies your cell samples and reports any abnormal cell changes. Your health care provider can discuss these changes with you. In some cases, an abnormal Pap test is due to an infection. More serious cell changes range from dysplasia to cancer. Talk to your health care provider about your Pap test.    Normal results  Cervical cells, even normal ones, are always changing. As they mature, normal squamous cells move from deeper layers within the cervix. Over time, these cells flatten and cover the surface of the cervix. Within the cervical canal, the cells are different. These glandular cells are taller and not as flat as the cells on the surface of the cervix. When a Pap test sample shows healthy cells of both types, the results are negative. Keep having Pap tests as often as directed.  Abnormal results  A positive Pap test result means some cells in the sample showed abnormal changes. These results are grouped by the type of cell change and the location, or extent, of the changes. Depending on the results, you may need further testing.  · Inflammation: Noncancerous changes are present. They may be due to normal cell repair. Or, they may be caused by an infection, such as HPV or yeast. Further testing may be needed. (Also called reactive cellular changes.)  · Atypical squamous cells: Test results are unclear. Cells on the surface of the cervix  show changes, but their significance is not yet known. Testing for HPV and other sexually transmitted infections (STIs) may be needed. Treatment may be required. (Reported as ASC-US or ASC-H.)  · Atypical glandular cells: Cells lining the cervical canal show abnormal changes. Further testing is likely. You may also have treatment to destroy or remove problem cells. (Reported as AGC.)  · Mild dysplasia: Cells show distinct changes. More testing or HPV typing may be done. You may also have treatment to destroy or remove problem cells. (Reported as low-grade ILA or XIOMARA 1.)  · Moderate to severe dysplasia: Cells show precancerous changes. Or, noninvasive cancer (carcinoma in situ) may be present. Treatment to destroy or remove problem cells is likely. (Reported as high-grade ILA or XIOMARA 2 or XIOMARA 3.)  · Cancer: Different types of cancer may be detected by your Pap test. More tests to assess the cancer's extent are likely. The type of treatment will depend on the test results and other factors, such as age and health history. (Reported as squamous cell carcinoma, endocervical adenocarcinoma in situ, or adenocarcinoma.)  Date Last Reviewed: 5/12/2015  © 1922-1908 [employer]. [address] [place] [address]. All rights reserved. This information is not intended as a substitute for professional medical care. Always follow your healthcare professional's instructions.        Understanding Periods  Having a period is a normal, healthy part of becoming and being a woman. A period is the result of a cycle that takes place inside a girls body. This menstrual cycle makes it possible for women to have babies. The cycle begins with the first day of bleeding. In the middle of the cycle, ovulation occurs. This is when an egg is released and begins its journey from the ovary to the uterus.    An egg is released  · During each cycle, 1 egg grows and is released from an ovary. It finds its way to the fallopian  tube.  The egg travels through a tube  · The egg moves through the fallopian tube toward the uterus. (If the egg and a mans sperm meet here, a woman becomes pregnant.)  The lining thickens  · The lining of the uterus grows thicker. This lining is made up of blood, tissue, and fluid. (The lining will nourish a growing baby during pregnancy.)  The egg and lining are shed  · About once a month, the egg and the lining of the uterus are shed through the vagina. This is called a period. (A period does not happen during pregnancy.)  Date Last Reviewed: 5/9/2015 © 2000-2017 [employer]. [address] [place] [address], [place] [address]. All rights reserved. This information is not intended as a substitute for professional medical care. Always follow your healthcare professional's instructions.        Birth Control: The Pill    Birth control pills contain hormones that help prevent pregnancy. The pills are prescribed by your healthcare provider. There are many types of birth control pills available. If you have side effects from one type of pill, tell your healthcare provider. He or she may be able to prescribe a pill that works better for you.  Pregnancy rates  Talk to your healthcare provider about the effectiveness of this birth control method.  Using the pill  · Take one pill daily. Take it at around the same time each day.  · Follow your healthcare providers guidelines on when to start your first pack of pills. You may need to use another form of birth control for a week or more after you start.  · Know what to do if you forget to take a pill. (Consult your healthcare provider or check the package.) If you miss more than one pill, you may need to use a backup method of birth control for a week or more.  Pros  · Low pregnancy rate  · No interruption to sex  · Easy to use  · Can help make periods more regular  · May lower your risk of ovarian cysts and certain cancers  · May decrease menstrual cramps, menstrual  flow, and acne  Cons  · Does not protect against sexually transmitted infection (STIs)  · Requires taking a pill on time each day  · May not work as well when taken with certain other medicines (check with your pharmacist)  · May cause side effects such as nausea, irregular bleeding, headaches, breast tenderness, fatigue, or mood changes (these often go away within 3 months)  · May increase the risk of blood clots, heart attack, and stroke  The pill may not be for you  The pill may not be for you if:  · You are a smoker and over age 35  · You have high blood pressure or gallbladder, liver, cerebrovascular  or heart disease  · You have diabetes, migraines, blood clot in the vein or artery, lupus, depression, certain lipid disorders, or take medicines that interfere with the pill  In these cases, discuss the risks with your healthcare provider.  Date Last Reviewed: 3/1/2017  © 1492-2394 "[employer]. [address] [place] [address]. All rights reserved. This information is not intended as a substitute for professional medical care. Always follow your healthcare professional's instructions.        Anatomy of the Female Urinary Tract  Your urinary tract helps get rid of urine (your bodys liquid waste). The kidneys collect chemicals and water your body doesnt need. This is turned into urine. Urine travels out of the kidneys through the ureters to the bladder. The bladder holds urine until youre ready to release it. The urethra carries urine from the bladder out of the body. The main sphincter muscle circles the mid-urethra.      Front view of female urinary tract.     Date Last Reviewed: 1/1/2017  © 5528-0293 "[employer]. [address] [place] [address]. All rights reserved. This information is not intended as a substitute for professional medical care. Always follow your healthcare professional's instructions.        Urinary Tract Infections in Women    Urinary tract  infections (UTIs) are most often caused by bacteria (germs). These bacteria enter the urinary tract. The bacteria may come from outside the body. Or they may travel from the skin outside the rectum or vagina into the urethra. Female anatomy makes it easy for bacteria from the bowel to enter a womans urinary tract, which is the most common source of UTI. This means women develop UTIs more often than men. Pain in or around the urinary tract is a common UTI symptom. But the only way to know for sure if you have a UTI for the healthcare provider to test your urine. The two tests that may be done are the urinalysis and urine culture.  Types of UTIs  · Cystitis: A bladder infection (cystitis) is the most common UTI in women. You may have urgent or frequent urination. You may also have pain, burning when you urinate, and bloody urine.  · Urethritis: This is an inflamed urethra, which is the tube that carries urine from the bladder to outside the body. You may have lower stomach or back pain. You may also have urgent or frequent urination.  · Pyelonephritis: This is a kidney infection. If not treated, it can be serious and damage your kidneys. In severe cases, you may be hospitalized. You may have a fever and lower back pain.  Medicines to treat a UTI  Most UTIs are treated with antibiotics. These kill the bacteria. The length of time you need to take them depends on the type of infection. It may be as short as 3 days. If you have repeated UTIs, a low-dose antibiotic may be needed for several months. Take antibiotics exactly as directed. Dont stop taking them until all of the medicine is gone. If you stop taking the antibiotic too soon, the infection may not go away, and you may develop a resistance to the antibiotic. This can make it much harder to treat.  Lifestyle changes to treat and prevent UTIs  The lifestyle changes below will help get rid of your UTI. They may also help prevent future UTIs.  · Drink plenty of  fluids. This includes water, juice, or other caffeine-free drinks. Fluids help flush bacteria out of your body.  · Empty your bladder. Always empty your bladder when you feel the urge to urinate. And always urinate before going to sleep. Urine that stays in your bladder can lead to infection. Try to urinate before and after sex as well.  · Practice good personal hygiene. Wipe yourself from front to back after using the toilet. This helps keep bacteria from getting into the urethra.  · Use condoms during sex. These help prevent UTIs caused by sexually transmitted bacteria. Also, avoid using spermicides during sex. These can increase the risk of UTIs. Choose other forms of birth control instead. For women who tend to get UTIs after sex, a low-dose of a preventive antibiotic may be used. Be sure to discuss this option with your healthcare provider.  · Follow up with your healthcare provider as directed. He or she may test to make sure the infection has cleared. If needed, more treatment may be started.  Date Last Reviewed: 1/1/2017 © 2000-2017 [employer]. [address] [place] [address]. All rights reserved. This information is not intended as a substitute for professional medical care. Always follow your healthcare professional's instructions.        Understanding Urinary Tract Infections (UTIs)  Most UTIs are caused by bacteria, although they may also be caused by viruses or fungi. Bacteria from the bowel are the most common source of infection. The infection may start because of any of the following:  · Sexual activity. During sex, bacteria can travel from the penis, vagina, or rectum into the urethra.   · Bacteria on the skin outside the rectum may travel into the urethra. This is more common in women since the rectum and urethra are closer to each other than in men. Wiping from front to back after using the toilet and keeping the area clean can help prevent germs from getting to the  urethra.  · Blockage of urine flow through the urinary tract. If urine sits too long, germs may start to grow out of control.      Parts of the urinary tract  The infection can occur in any part of the urinary tract.  · The kidneys collect and store urine.  · The ureters carry urine from the kidneys to the bladder.  · The bladder holds urine until you are ready to let it out.  · The urethra carries urine from the bladder out of the body. It is shorter in women, so bacteria can move through it more easily. The urethra is longer in men, so a UTI is less likely to reach the bladder or kidneys in men.  Date Last Reviewed: 1/1/2017  © 0114-7925 [employer]. [address] [place] [address], [place] [address]. All rights reserved. This information is not intended as a substitute for professional medical care. Always follow your healthcare professional's instructions.

## 2018-01-26 NOTE — LETTER
January 26, 2018      Kelsey Seaman PA-C  [address]  [place] [address]           [facility] - OB/ GYN  [address] [facility] [place] [address]  Phone: [phone]  Fax: [phone]          Patient: Alea Cooley   MR Number: [id number]   YOB: 1995   Date of Visit: 1/26/2018       Dear Kelsey Seaman:    Thank you for referring Alea Cooley to me for evaluation. Attached you will find relevant portions of my assessment and plan of care.    If you have questions, please do not hesitate to call me. I look forward to following Alea Cooley along with you.    Sincerely,    Mary Herron, NP    Enclosure  CC:  No Recipients    If you would like to receive this communication electronically, please contact [email or web address] or [phone] to request more information on [employer] [employer] access.    For providers and/or their staff who would like to refer a patient to [facility], please contact us through our one-stop-shop provider referral line, [facility], at [phone].    If you feel you have received this communication in error or would no longer like to receive these types of communications, please e-mail [email or web address]

## 2018-01-27 LAB
C TRACH DNA SPEC QL NAA+PROBE: NOT DETECTED
N GONORRHOEA DNA SPEC QL NAA+PROBE: NOT DETECTED

## 2018-01-29 ENCOUNTER — PATIENT MESSAGE (OUTPATIENT)
Dept: OBSTETRICS AND GYNECOLOGY | Facility: CLINIC | Age: 23
End: 2018-01-29

## 2018-01-31 ENCOUNTER — PATIENT MESSAGE (OUTPATIENT)
Dept: OBSTETRICS AND GYNECOLOGY | Facility: CLINIC | Age: 23
End: 2018-01-31

## 2018-04-09 ENCOUNTER — OFFICE VISIT (OUTPATIENT)
Dept: INTERNAL MEDICINE | Facility: CLINIC | Age: 23
End: 2018-04-09
Payer: COMMERCIAL

## 2018-04-09 VITALS
HEIGHT: 67 IN | TEMPERATURE: 98 F | BODY MASS INDEX: 23.11 KG/M2 | SYSTOLIC BLOOD PRESSURE: 110 MMHG | OXYGEN SATURATION: 98 % | HEART RATE: 108 BPM | WEIGHT: 147.25 LBS | DIASTOLIC BLOOD PRESSURE: 72 MMHG

## 2018-04-09 DIAGNOSIS — J30.1 ACUTE SEASONAL ALLERGIC RHINITIS DUE TO POLLEN: Primary | ICD-10-CM

## 2018-04-09 PROCEDURE — 96372 THER/PROPH/DIAG INJ SC/IM: CPT | Mod: S$GLB,,, | Performed by: PEDIATRICS

## 2018-04-09 PROCEDURE — 99213 OFFICE O/P EST LOW 20 MIN: CPT | Mod: 25,S$GLB,, | Performed by: PHYSICIAN ASSISTANT

## 2018-04-09 PROCEDURE — 99999 PR PBB SHADOW E&M-EST. PATIENT-LVL III: CPT | Mod: PBBFAC,,, | Performed by: PHYSICIAN ASSISTANT

## 2018-04-09 RX ORDER — FLUTICASONE PROPIONATE 50 MCG
2 SPRAY, SUSPENSION (ML) NASAL DAILY
Qty: 16 G | Refills: 2 | Status: SHIPPED | OUTPATIENT
Start: 2018-04-09 | End: 2018-04-25

## 2018-04-09 RX ORDER — METHYLPREDNISOLONE ACETATE 80 MG/ML
80 INJECTION, SUSPENSION INTRA-ARTICULAR; INTRALESIONAL; INTRAMUSCULAR; SOFT TISSUE
Status: COMPLETED | OUTPATIENT
Start: 2018-04-09 | End: 2018-04-09

## 2018-04-09 RX ADMIN — METHYLPREDNISOLONE ACETATE 80 MG: 80 INJECTION, SUSPENSION INTRA-ARTICULAR; INTRALESIONAL; INTRAMUSCULAR; SOFT TISSUE at 09:04

## 2018-04-09 NOTE — PROGRESS NOTES
Subjective:       Patient ID: Alea Cooley is a 23 y.o.W/ female.    Chief Complaint: Nasal Congestion    HPI         She comes in by herself today and has the above problem.  She really feels miserable today can hardly breathe from her nose.  She is actually mouth breathing and drying her mouth out overnight.  She started these symptoms about Thursday, 29 March and has been taking some DayQuil, Claritin-D, and nose spray that her mother gave her.  None of these seem to be helping at all.  She has a little fullness to her both ears.  There is no pain in the ears.  She also has a lot of PND but no sore throat.  She has had occasional coughing but very rare.  There has been no fever, chills, rigors, balance problems, hearing loss, choking or trouble swallowing, spastic coughing, CP, pleurisy, sweats, diaphoresis etc.  She has had a little bit of gagging and nausea due to the PND but no actual emesis.    Review of Systems    Otherwise negative concerning the ENT, RESPIRATORY, PULMONARY, and GI system review.    Objective:      Physical Exam    EARS: Canals are normal with very little wax present.  There is no swelling or redness to the canals.  Tympanic membranes are transparent and clear bilaterally.  NOSE: Very narrow and she has a heavy nasal tone to her voice.  Turbinates are almost 100% edematous and swollen closed.  There is no erythema or redness to the turbinates.  I don't see any rhinorrhea or mucopurulence present but there is some yellow crusting on the external nares on the left side.  THROAT: Open and clear without any redness in the pharynx or the soft palate.  Uvula appears normal without swelling or gelatinous appearance.  There are no exudates or halitosis present.  She doesn't have any tenderness to the neck glands or cervical adenopathy.  CHEST: Clear BS anterior to posterior.  She has no wheeze, rhonchi, or rales.  She's not in any respiratory distress.  There is no hoarseness to her  voice.    Assessment:       1. Acute seasonal allergic rhinitis due to pollen        Plan:     1.  She was offered and except a Depo-Medrol 80 mg IM injection today.  2.  Also start her on Flonase and proper instruction on how to use this product was given.  She can continue with her Claritin-D daily and also use Mucinex DM 1200 mg if necessary should coughing develop.  3.  She can go back to work today.  Recheck if symptoms increase or persist.

## 2018-04-11 ENCOUNTER — OFFICE VISIT (OUTPATIENT)
Dept: FAMILY MEDICINE | Facility: CLINIC | Age: 23
End: 2018-04-11
Payer: COMMERCIAL

## 2018-04-11 ENCOUNTER — HOSPITAL ENCOUNTER (OUTPATIENT)
Dept: RADIOLOGY | Facility: HOSPITAL | Age: 23
Discharge: HOME | End: 2018-04-11
Attending: FAMILY MEDICINE
Payer: COMMERCIAL

## 2018-04-11 VITALS
RESPIRATION RATE: 18 BRPM | WEIGHT: 146.81 LBS | HEIGHT: 67 IN | OXYGEN SATURATION: 99 % | BODY MASS INDEX: 23.04 KG/M2 | SYSTOLIC BLOOD PRESSURE: 111 MMHG | HEART RATE: 79 BPM | DIASTOLIC BLOOD PRESSURE: 60 MMHG | TEMPERATURE: 97 F

## 2018-04-11 DIAGNOSIS — J32.9 CHRONIC SINUSITIS, UNSPECIFIED LOCATION: Primary | ICD-10-CM

## 2018-04-11 DIAGNOSIS — J32.9 CHRONIC SINUSITIS, UNSPECIFIED LOCATION: ICD-10-CM

## 2018-04-11 PROCEDURE — 70220 X-RAY EXAM OF SINUSES: CPT | Mod: TC,FY,PO

## 2018-04-11 PROCEDURE — 99214 OFFICE O/P EST MOD 30 MIN: CPT | Mod: S$GLB,,, | Performed by: FAMILY MEDICINE

## 2018-04-11 PROCEDURE — 99999 PR PBB SHADOW E&M-EST. PATIENT-LVL IV: CPT | Mod: PBBFAC,,, | Performed by: FAMILY MEDICINE

## 2018-04-11 PROCEDURE — 70220 X-RAY EXAM OF SINUSES: CPT | Mod: 26,,, | Performed by: RADIOLOGY

## 2018-04-11 RX ORDER — DOXYCYCLINE HYCLATE 100 MG
100 TABLET ORAL 2 TIMES DAILY
Qty: 10 TABLET | Refills: 0 | Status: SHIPPED | OUTPATIENT
Start: 2018-04-11 | End: 2018-04-16

## 2018-04-11 NOTE — PROGRESS NOTES
Subjective:       Patient ID: Alea Cooley is a 23 y.o. female.    Chief Complaint: Sinus Problem      HPI   Ms. Cooley presents to clinic today for sinus problem.   She states it has been going on since March 29.   She was seen in urgent care for this and got steroid shot on 4/9.   She states she never really got better.  She states she still has congestion, runny nose, sinus pressure and cough.   She is also having sweats and nausea.       Review of Systems   Constitutional: Positive for appetite change and diaphoresis. Negative for fever.   HENT: Positive for congestion, postnasal drip, rhinorrhea and sinus pressure. Negative for sneezing and sore throat.    Eyes: Negative for discharge and itching.   Respiratory: Positive for cough.    Cardiovascular: Negative for chest pain.   Gastrointestinal: Positive for abdominal pain and nausea. Negative for vomiting.       Medication List with Changes/Refills   New Medications    DOXYCYCLINE (VIBRA-TABS) 100 MG TABLET    Take 1 tablet (100 mg total) by mouth 2 (two) times daily.   Current Medications    FLUOXETINE (PROZAC) 20 MG CAPSULE    Take 1 capsule (20 mg total) by mouth once daily.    FLUTICASONE (FLONASE) 50 MCG/ACTUATION NASAL SPRAY    2 sprays (100 mcg total) by Each Nare route once daily.    LEVONORGESTREL-ETHINYL ESTRADIOL (AVIANE,ALESSE,LESSINA) 0.1-20 MG-MCG PER TABLET    Take 1 tablet by mouth once daily.       Patient Active Problem List   Diagnosis    Anxiety and depression    Acute seasonal allergic rhinitis due to pollen         Objective:     Physical Exam   Constitutional: She is oriented to person, place, and time. She appears well-developed and well-nourished. No distress.   HENT:   Head: Normocephalic and atraumatic.   Right Ear: External ear normal.   Left Ear: External ear normal.   Mouth/Throat: Oropharynx is clear and moist. No oropharyngeal exudate.   Tenderness on sinus passages     Eyes: EOM are normal. Right eye exhibits no discharge.  Left eye exhibits no discharge.   Cardiovascular: Normal rate and regular rhythm.    Pulmonary/Chest: Effort normal and breath sounds normal. No respiratory distress. She has no wheezes.   Musculoskeletal: She exhibits no edema.   Neurological: She is alert and oriented to person, place, and time.   Skin: Skin is warm and dry. She is not diaphoretic. No erythema.   Psychiatric: She has a normal mood and affect.   Vitals reviewed.    Vitals:    04/11/18 1100   BP: 111/60   Pulse: 79   Resp: 18   Temp: 97.1 °F (36.2 °C)       Assessment/  PLAN     Chronic sinusitis, unspecified location  -     X-Ray Sinuses Min 3 Views; Future; Expected date: 04/11/2018  -     doxycycline (VIBRA-TABS) 100 MG tablet; Take 1 tablet (100 mg total) by mouth 2 (two) times daily.  Dispense: 10 tablet; Refill: 0  - try nasal saline, netti pot, steam to face     Plan as above  rtc prn           Jewell Mayes MD  [facility]

## 2018-04-11 NOTE — PATIENT INSTRUCTIONS
Sinusitis (Antibiotic Treatment)    The sinuses are air-filled spaces within the bones of the face. They connect to the inside of the nose. Sinusitis is an inflammation of the tissue lining the sinus cavity. Sinus inflammation can occur during a cold. It can also be due to allergies to pollens and other particles in the air. Sinusitis can cause symptoms of sinus congestion and fullness. A sinus infection causes fever, headache and facial pain. There is often green or yellow drainage from the nose or into the back of the throat (post-nasal drip). You have been given antibiotics to treat this condition.  Home care:  · Take the full course of antibiotics as instructed. Do not stop taking them, even if you feel better.  · Drink plenty of water, hot tea, and other liquids. This may help thin mucus. It also may promote sinus drainage.  · Heat may help soothe painful areas of the face. Use a towel soaked in hot water. Or,  the shower and direct the hot spray onto your face. Using a vaporizer along with a menthol rub at night may also help.   · An expectorant containing guaifenesin may help thin the mucus and promote drainage from the sinuses.  · Over-the-counter decongestants may be used unless a similar medicine was prescribed. Nasal sprays work the fastest. Use one that contains phenylephrine or oxymetazoline. First blow the nose gently. Then use the spray. Do not use these medicines more often than directed on the label or symptoms may get worse. You may also use tablets containing pseudoephedrine. Avoid products that combine ingredients, because side effects may be increased. Read labels. You can also ask the pharmacist for help. (NOTE: Persons with high blood pressure should not use decongestants. They can raise blood pressure.)  · Over-the-counter antihistamines may help if allergies contributed to your sinusitis.    · Do not use nasal rinses or irrigation during an acute sinus infection, unless told to by  your health care provider. Rinsing may spread the infection to other sinuses.  · Use acetaminophen or ibuprofen to control pain, unless another pain medicine was prescribed. (If you have chronic liver or kidney disease or ever had a stomach ulcer, talk with your doctor before using these medicines. Aspirin should never be used in anyone under 18 years of age who is ill with a fever. It may cause severe liver damage.)  · Don't smoke. This can worsen symptoms.  Follow-up care  Follow up with your healthcare provider or our staff if you are not improving within the next week.  When to seek medical advice  Call your healthcare provider if any of these occur:  · Facial pain or headache becoming more severe  · Stiff neck  · Unusual drowsiness or confusion  · Swelling of the forehead or eyelids  · Vision problems, including blurred or double vision  · Fever of 100.4ºF (38ºC) or higher, or as directed by your healthcare provider  · Seizure  · Breathing problems  · Symptoms not resolving within 10 days  Date Last Reviewed: 4/13/2015  © 7527-5067 [employer]. [address] [place] [address], [place] [address]. All rights reserved. This information is not intended as a substitute for professional medical care. Always follow your healthcare professional's instructions.

## 2018-04-25 ENCOUNTER — OFFICE VISIT (OUTPATIENT)
Dept: INTERNAL MEDICINE | Facility: CLINIC | Age: 23
End: 2018-04-25
Payer: COMMERCIAL

## 2018-04-25 ENCOUNTER — LAB VISIT (OUTPATIENT)
Dept: LAB | Facility: HOSPITAL | Age: 23
End: 2018-04-25
Attending: PHYSICIAN ASSISTANT
Payer: COMMERCIAL

## 2018-04-25 VITALS
HEIGHT: 67 IN | HEART RATE: 88 BPM | DIASTOLIC BLOOD PRESSURE: 70 MMHG | TEMPERATURE: 98 F | SYSTOLIC BLOOD PRESSURE: 118 MMHG | BODY MASS INDEX: 23.63 KG/M2 | OXYGEN SATURATION: 98 % | WEIGHT: 150.56 LBS

## 2018-04-25 DIAGNOSIS — M54.9 BACK PAIN, UNSPECIFIED BACK LOCATION, UNSPECIFIED BACK PAIN LATERALITY, UNSPECIFIED CHRONICITY: Primary | ICD-10-CM

## 2018-04-25 DIAGNOSIS — M54.9 BACK PAIN, UNSPECIFIED BACK LOCATION, UNSPECIFIED BACK PAIN LATERALITY, UNSPECIFIED CHRONICITY: ICD-10-CM
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
## 2018-04-25 DIAGNOSIS — N92.0 SPOTTING: ICD-10-CM

## 2018-04-25 LAB — B-HCG UR QL: NEGATIVE

## 2018-04-25 PROCEDURE — 81025 URINE PREGNANCY TEST: CPT | Mod: PO

## 2018-04-25 PROCEDURE — 99213 OFFICE O/P EST LOW 20 MIN: CPT | Mod: S$GLB,,, | Performed by: PHYSICIAN ASSISTANT

## 2018-04-25 PROCEDURE — 99999 PR PBB SHADOW E&M-EST. PATIENT-LVL III: CPT | Mod: PBBFAC,,, | Performed by: PHYSICIAN ASSISTANT

## 2018-04-25 RX ORDER — CYCLOBENZAPRINE HCL 10 MG
10 TABLET ORAL 3 TIMES DAILY PRN
Qty: 15 TABLET | Refills: 0 | Status: CANCELLED | OUTPATIENT
Start: 2018-04-25 | End: 2018-05-05

## 2018-04-25 RX ORDER — NAPROXEN 500 MG/1
500 TABLET ORAL 2 TIMES DAILY PRN
Qty: 30 TABLET | Refills: 0 | Status: SHIPPED | OUTPATIENT
Start: 2018-04-25 | End: 2018-06-06

## 2018-04-25 RX ORDER — TIZANIDINE 4 MG/1
4 TABLET ORAL EVERY 8 HOURS PRN
Qty: 15 TABLET | Refills: 0 | Status: SHIPPED | OUTPATIENT
Start: 2018-04-25 | End: 2018-05-05

## 2018-04-25 RX ORDER — CYCLOBENZAPRINE HCL 5 MG
5 TABLET ORAL 3 TIMES DAILY PRN
Qty: 15 TABLET | Refills: 0 | Status: SHIPPED | OUTPATIENT
Start: 2018-04-25 | End: 2018-04-25 | Stop reason: CLARIF
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
## 2018-06-06 ENCOUNTER — OFFICE VISIT (OUTPATIENT)
Dept: INTERNAL MEDICINE | Facility: CLINIC | Age: 23
End: 2018-06-06
Payer: COMMERCIAL

## 2018-06-06 VITALS
OXYGEN SATURATION: 98 % | HEIGHT: 67 IN | BODY MASS INDEX: 23.08 KG/M2 | SYSTOLIC BLOOD PRESSURE: 106 MMHG | DIASTOLIC BLOOD PRESSURE: 62 MMHG | WEIGHT: 147.06 LBS | TEMPERATURE: 98 F | HEART RATE: 83 BPM

## 2018-06-06 DIAGNOSIS — Z00.00 ENCOUNTER FOR PREVENTIVE HEALTH EXAMINATION: Primary | ICD-10-CM

## 2018-06-06 DIAGNOSIS — F41.9 ANXIETY AND DEPRESSION: ICD-10-CM

## 2018-06-06 DIAGNOSIS — F32.A ANXIETY AND DEPRESSION: ICD-10-CM

## 2018-06-06 PROCEDURE — 99395 PREV VISIT EST AGE 18-39: CPT | Mod: 25,S$GLB,, | Performed by: INTERNAL MEDICINE

## 2018-06-06 PROCEDURE — 90651 9VHPV VACCINE 2/3 DOSE IM: CPT | Mod: S$GLB,,, | Performed by: INTERNAL MEDICINE

## 2018-06-06 PROCEDURE — 99999 PR PBB SHADOW E&M-EST. PATIENT-LVL IV: CPT | Mod: PBBFAC,,, | Performed by: INTERNAL MEDICINE

## 2018-06-06 PROCEDURE — 90471 IMMUNIZATION ADMIN: CPT | Mod: S$GLB,,, | Performed by: INTERNAL MEDICINE

## 2018-06-06 RX ORDER — BUSPIRONE HYDROCHLORIDE 5 MG/1
5 TABLET ORAL 2 TIMES DAILY
Qty: 60 TABLET | Refills: 5 | Status: SHIPPED | OUTPATIENT
Start: 2018-06-06 | End: 2018-08-08

## 2018-07-02 DIAGNOSIS — F41.9 ANXIETY AND DEPRESSION: ICD-10-CM

## 2018-07-02 DIAGNOSIS — F32.A ANXIETY AND DEPRESSION: ICD-10-CM

## 2018-07-02 RX ORDER — FLUOXETINE HYDROCHLORIDE 20 MG/1
CAPSULE ORAL
Qty: 30 CAPSULE | Refills: 6 | Status: SHIPPED | OUTPATIENT
Start: 2018-07-02 | End: 2018-07-03 | Stop reason: SDUPTHER

## 2018-07-03 RX ORDER — FLUOXETINE HYDROCHLORIDE 20 MG/1
20 CAPSULE ORAL DAILY
Qty: 90 CAPSULE | Refills: 3 | Status: SHIPPED | OUTPATIENT
Start: 2018-07-03 | End: 2018-08-08

## 2018-07-31 NOTE — PROGRESS NOTES
"Subjective:      Patient ID: Alea Cooley is a 23 y.o. female.    Chief Complaint: Follow-up      HPI  Here for follow up of medical problems.  Hasn't seen counselor.  Buspar made her groggy.  Prozac also makes her feel a little foggy, but has continued taking it.  Has started some regular exercise at the gym, and feels it helps her anxiety feeling.    Updated/ annual due 6/19:  HM:  8/18 today HPV#2, 5/11 TDaP.     Review of Systems   Constitutional: Negative for activity change, chills, diaphoresis, fever and unexpected weight change.   HENT: Negative for hearing loss, rhinorrhea and trouble swallowing.    Eyes: Negative for discharge and visual disturbance.   Respiratory: Negative for cough, chest tightness, shortness of breath and wheezing.    Cardiovascular: Negative for chest pain, palpitations and leg swelling.   Gastrointestinal: Negative for blood in stool, constipation, diarrhea, nausea and vomiting.   Endocrine: Negative for polydipsia and polyuria.   Genitourinary: Negative for difficulty urinating, dysuria, frequency, hematuria and menstrual problem.   Musculoskeletal: Negative for arthralgias, joint swelling and neck pain.   Neurological: Negative for weakness and headaches.   Psychiatric/Behavioral: Negative for confusion and dysphoric mood. The patient is not nervous/anxious.          Objective:   /60 (BP Location: Right arm, Patient Position: Sitting, BP Method: Medium (Manual))   Pulse 71   Temp 98 °F (36.7 °C) (Tympanic)   Ht 5' 7" (1.702 m)   Wt 68.8 kg (151 lb 10.8 oz)   LMP 07/30/2018 (Approximate)   SpO2 98%   BMI 23.76 kg/m²     Physical Exam   Constitutional: She is oriented to person, place, and time. She appears well-developed.   HENT:   Mouth/Throat: Oropharynx is clear and moist.   Neck: Neck supple. Carotid bruit is not present. No thyroid mass present.   Cardiovascular: Normal rate, regular rhythm and intact distal pulses.  Exam reveals no gallop and no friction rub.    No " murmur heard.  Pulmonary/Chest: Effort normal and breath sounds normal. She has no wheezes. She has no rales.   Abdominal: Soft. Bowel sounds are normal. She exhibits no mass. There is no hepatosplenomegaly. There is no tenderness.   Musculoskeletal: She exhibits no edema.   Lymphadenopathy:     She has no cervical adenopathy.   Neurological: She is alert and oriented to person, place, and time.   Psychiatric: She exhibits a depressed mood.           Assessment:       1. Anxiety and depression    2. Preventive measure          Plan:     Anxiety and depression- stop prozac, start wellbutrin.  Refer for counseling.  RTC 2mo.  -     buPROPion (WELLBUTRIN XL) 150 MG TB24 tablet; Take 1 tablet (150 mg total) by mouth once daily.  Dispense: 30 tablet; Refill: 6    Preventive measure  -     (In Office Administered) HPV Vaccine (9-Valent) (3 Dose) (IM)

## 2018-08-08 ENCOUNTER — OFFICE VISIT (OUTPATIENT)
Dept: INTERNAL MEDICINE | Facility: CLINIC | Age: 23
End: 2018-08-08
Payer: COMMERCIAL

## 2018-08-08 VITALS
BODY MASS INDEX: 23.81 KG/M2 | WEIGHT: 151.69 LBS | HEART RATE: 71 BPM | DIASTOLIC BLOOD PRESSURE: 60 MMHG | OXYGEN SATURATION: 98 % | SYSTOLIC BLOOD PRESSURE: 104 MMHG | HEIGHT: 67 IN | TEMPERATURE: 98 F

## 2018-08-08 DIAGNOSIS — F41.9 ANXIETY AND DEPRESSION: Primary | ICD-10-CM

## 2018-08-08 DIAGNOSIS — F32.A ANXIETY AND DEPRESSION: Primary | ICD-10-CM

## 2018-08-08 DIAGNOSIS — Z29.9 PREVENTIVE MEASURE: ICD-10-CM

## 2018-08-08 PROCEDURE — 90651 9VHPV VACCINE 2/3 DOSE IM: CPT | Mod: S$GLB,,, | Performed by: INTERNAL MEDICINE

## 2018-08-08 PROCEDURE — 99213 OFFICE O/P EST LOW 20 MIN: CPT | Mod: 25,S$GLB,, | Performed by: INTERNAL MEDICINE

## 2018-08-08 PROCEDURE — 3008F BODY MASS INDEX DOCD: CPT | Mod: CPTII,S$GLB,, | Performed by: INTERNAL MEDICINE

## 2018-08-08 PROCEDURE — 90471 IMMUNIZATION ADMIN: CPT | Mod: S$GLB,,, | Performed by: INTERNAL MEDICINE

## 2018-08-08 PROCEDURE — 99999 PR PBB SHADOW E&M-EST. PATIENT-LVL III: CPT | Mod: PBBFAC,,, | Performed by: INTERNAL MEDICINE

## 2018-08-08 RX ORDER — BUPROPION HYDROCHLORIDE 150 MG/1
150 TABLET ORAL DAILY
Qty: 30 TABLET | Refills: 6 | Status: SHIPPED | OUTPATIENT
Start: 2018-08-08 | End: 2018-09-11 | Stop reason: SDUPTHER

## 2018-08-20 ENCOUNTER — PATIENT MESSAGE (OUTPATIENT)
Dept: INTERNAL MEDICINE | Facility: CLINIC | Age: 23
End: 2018-08-20

## 2018-08-21 ENCOUNTER — OFFICE VISIT (OUTPATIENT)
Dept: INTERNAL MEDICINE | Facility: CLINIC | Age: 23
End: 2018-08-21
Payer: COMMERCIAL

## 2018-08-21 VITALS
WEIGHT: 152.13 LBS | BODY MASS INDEX: 23.88 KG/M2 | HEART RATE: 78 BPM | SYSTOLIC BLOOD PRESSURE: 106 MMHG | DIASTOLIC BLOOD PRESSURE: 78 MMHG | HEIGHT: 67 IN | TEMPERATURE: 96 F

## 2018-08-21 DIAGNOSIS — F41.9 ANXIETY AND DEPRESSION: Primary | ICD-10-CM

## 2018-08-21 DIAGNOSIS — F32.A ANXIETY AND DEPRESSION: Primary | ICD-10-CM

## 2018-08-21 PROCEDURE — 99999 PR PBB SHADOW E&M-EST. PATIENT-LVL III: CPT | Mod: PBBFAC,,, | Performed by: FAMILY MEDICINE

## 2018-08-21 PROCEDURE — 99214 OFFICE O/P EST MOD 30 MIN: CPT | Mod: S$GLB,,, | Performed by: FAMILY MEDICINE

## 2018-08-21 PROCEDURE — 3008F BODY MASS INDEX DOCD: CPT | Mod: CPTII,S$GLB,, | Performed by: FAMILY MEDICINE

## 2018-08-21 RX ORDER — CLONAZEPAM 0.5 MG/1
0.5 TABLET ORAL NIGHTLY
Qty: 30 TABLET | Refills: 0 | Status: SHIPPED | OUTPATIENT
Start: 2018-08-21 | End: 2018-09-11 | Stop reason: SDUPTHER

## 2018-08-21 NOTE — PROGRESS NOTES
Subjective:       Patient ID: Alea Cooley is a 23 y.o. female.    Chief Complaint: Anxiety and Medication Problem    Anxiety:      Pt is a 23 year old who is no on wellbutrin switched from Prozac. Pt is having some panic attacks with tight chest and overwhelming feeling. Pt reports increase sympathetic response where she feels out of control. Pt has been in counseling in the past and has an appt in Sept. Pt has been hospitalized for psychiatric issues.       Review of Systems   Constitutional: Negative.    HENT: Negative.    Respiratory: Negative.    Gastrointestinal: Negative.    Genitourinary: Negative.    Musculoskeletal: Negative.    Skin: Negative.    Neurological: Negative.    Hematological: Negative.    Psychiatric/Behavioral: Positive for decreased concentration. Negative for behavioral problems, confusion, dysphoric mood, hallucinations, self-injury, sleep disturbance and suicidal ideas. The patient is nervous/anxious. The patient is not hyperactive.        Objective:      Physical Exam   Constitutional: She is oriented to person, place, and time. She appears well-developed and well-nourished.   Cardiovascular: Normal rate.   Pulmonary/Chest: Effort normal and breath sounds normal.   Abdominal: Soft. Bowel sounds are normal.   Neurological: She is alert and oriented to person, place, and time.   Skin: Skin is warm and dry.   Psychiatric: She has a normal mood and affect. Her behavior is normal.       Assessment:       1. Anxiety and depression        Plan:       Anxiety and depression  Comments:  Will continue with the Wellbutrin XL. Anxiety is heightened. Will start pt on Klonpin but psychiatry referral  Orders:  -     Ambulatory referral to Psychiatry    Other orders  -     clonazePAM (KLONOPIN) 0.5 MG tablet; Take 1 tablet (0.5 mg total) by mouth every evening.  Dispense: 30 tablet; Refill: 0

## 2018-09-11 DIAGNOSIS — Z30.011 ENCOUNTER FOR INITIAL PRESCRIPTION OF CONTRACEPTIVE PILLS: ICD-10-CM

## 2018-09-11 RX ORDER — BUPROPION HYDROCHLORIDE 150 MG/1
150 TABLET ORAL DAILY
Qty: 30 TABLET | Refills: 6 | Status: SHIPPED | OUTPATIENT
Start: 2018-09-11 | End: 2019-01-21 | Stop reason: SDUPTHER

## 2018-09-11 RX ORDER — LEVONORGESTREL AND ETHINYL ESTRADIOL 0.1-0.02MG
1 KIT ORAL DAILY
Qty: 28 TABLET | Refills: 4 | Status: SHIPPED | OUTPATIENT
Start: 2018-09-11 | End: 2019-02-25

## 2018-09-11 RX ORDER — CLONAZEPAM 0.5 MG/1
0.5 TABLET ORAL NIGHTLY
Qty: 30 TABLET | Refills: 0 | Status: SHIPPED | OUTPATIENT
Start: 2018-09-11 | End: 2018-10-10

## 2018-09-26 NOTE — PROGRESS NOTES
"Subjective:      Patient ID: Alea Cooley is a 23 y.o. female.    Chief Complaint: Follow-up (2 month)      HPI  Here for follow up of medical problems.  Changed on wellbutrin about 2mo ago.  Saw Dr. Michaud for anxiety attack and started on clonazepam.  2-3 weeks ago, started herself back on prozac in addition.  Feels "more stabilized."  Has appt to see Psychiatrist 10/31, and has seen counseling.      Updated/ annual due 6/19:  HM:  ref fluvax, 8/18 HPV#2, 5/11 TDaP.     Review of Systems   Constitutional: Negative for chills, diaphoresis and fever.   Respiratory: Negative for cough and shortness of breath.    Cardiovascular: Negative for chest pain, palpitations and leg swelling.   Gastrointestinal: Negative for blood in stool, constipation, diarrhea, nausea and vomiting.   Genitourinary: Negative for dysuria, frequency and hematuria.   Psychiatric/Behavioral: The patient is not nervous/anxious.          Objective:   /74 (BP Location: Right arm, Patient Position: Sitting)   Pulse 100   Temp 96.8 °F (36 °C) (Tympanic)   Ht 5' 7" (1.702 m)   Wt 67.5 kg (148 lb 13 oz)   SpO2 97%   BMI 23.31 kg/m²     Physical Exam   Constitutional: She is oriented to person, place, and time. She appears well-developed.   HENT:   Mouth/Throat: Oropharynx is clear and moist.   Neck: Neck supple. Carotid bruit is not present. No thyroid mass present.   Cardiovascular: Normal rate, regular rhythm and intact distal pulses. Exam reveals no gallop and no friction rub.   No murmur heard.  Pulmonary/Chest: Effort normal and breath sounds normal. She has no wheezes. She has no rales.   Abdominal: Soft. Bowel sounds are normal. She exhibits no mass. There is no hepatosplenomegaly. There is no tenderness.   Musculoskeletal: She exhibits no edema.   Lymphadenopathy:     She has no cervical adenopathy.   Neurological: She is alert and oriented to person, place, and time.   Psychiatric: She has a normal mood and affect. "           Assessment:       1. Anxiety and depression          Plan:     Anxiety and depression- cont wellbutrin/prozac/clonaz.  Cont counseling and see Psych.  -     FLUoxetine (PROZAC) 20 MG capsule; Take 1 capsule (20 mg total) by mouth once daily.  Dispense: 90 capsule; Refill: 1    RTC 6 mo.

## 2018-09-28 ENCOUNTER — OFFICE VISIT (OUTPATIENT)
Dept: PSYCHIATRY | Facility: CLINIC | Age: 23
End: 2018-09-28
Payer: COMMERCIAL

## 2018-09-28 DIAGNOSIS — F41.0 PANIC DISORDER WITHOUT AGORAPHOBIA: Primary | ICD-10-CM

## 2018-09-28 PROCEDURE — 90791 PSYCH DIAGNOSTIC EVALUATION: CPT | Mod: S$GLB,,, | Performed by: SOCIAL WORKER

## 2018-09-28 NOTE — LETTER
September 28, 2018        Alisha Ramirez MD  [address] [facility] [address] [place] [address]             [facility] - Psychiatry  [address]  [place] [address]  Phone: [phone]  Fax: [phone]   Patient: Alea Cooley   MR Number: [id number]   YOB: 1995   Date of Visit: 9/28/2018       Dear Dr. Ramirez:    Thank you for referring Alea Cooley to me for evaluation. Please see the initial evaluation of the relevant portions of my assessment and plan of care.    If you have questions, please do not hesitate to call me. I look forward to following Alea along with you.    Sincerely,      Milan Mejia, ELIDIA           CC  No Recipients
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
## 2018-10-10 ENCOUNTER — OFFICE VISIT (OUTPATIENT)
Dept: INTERNAL MEDICINE | Facility: CLINIC | Age: 23
End: 2018-10-10
Payer: COMMERCIAL

## 2018-10-10 VITALS
HEIGHT: 67 IN | BODY MASS INDEX: 23.36 KG/M2 | WEIGHT: 148.81 LBS | DIASTOLIC BLOOD PRESSURE: 74 MMHG | SYSTOLIC BLOOD PRESSURE: 116 MMHG | OXYGEN SATURATION: 97 % | HEART RATE: 100 BPM | TEMPERATURE: 97 F

## 2018-10-10 DIAGNOSIS — F41.9 ANXIETY AND DEPRESSION: Primary | ICD-10-CM

## 2018-10-10 DIAGNOSIS — F32.A ANXIETY AND DEPRESSION: Primary | ICD-10-CM

## 2018-10-10 PROCEDURE — 99213 OFFICE O/P EST LOW 20 MIN: CPT | Mod: S$GLB,,, | Performed by: INTERNAL MEDICINE

## 2018-10-10 PROCEDURE — 99999 PR PBB SHADOW E&M-EST. PATIENT-LVL III: CPT | Mod: PBBFAC,,, | Performed by: INTERNAL MEDICINE

## 2018-10-10 PROCEDURE — 3008F BODY MASS INDEX DOCD: CPT | Mod: CPTII,S$GLB,, | Performed by: INTERNAL MEDICINE

## 2018-10-10 RX ORDER — CLONAZEPAM 0.5 MG/1
0.5 TABLET ORAL DAILY
Qty: 30 TABLET | Refills: 0
Start: 2018-10-10 | End: 2018-10-29 | Stop reason: SDUPTHER

## 2018-10-10 RX ORDER — FLUOXETINE HYDROCHLORIDE 20 MG/1
20 CAPSULE ORAL DAILY
Qty: 90 CAPSULE | Refills: 1 | Status: SHIPPED | OUTPATIENT
Start: 2018-10-10 | End: 2019-05-28 | Stop reason: SDUPTHER

## 2018-10-10 NOTE — MEDICAL/APP STUDENT
Subjective:       Patient ID: Alea Cooley is a 23 y.o. female.    Chief Complaint: Follow-up (2 month)    HPI   Patient prevents for f/u of depression and anxiety. Has had one panic attack since starting Klonopin, and put herself back on Prozac 2 weeks ago in addition to Wellbutrin and Klonopin.  Says she feels like Prozac stabilizes her mood best and that she is doing okay on these three medications.  Seen by  for counseling.  Will see psychiatrist at the end of the month. Denies CP, SOB, n/v/d, abd pain. Denies fever, cough, sweats, chills.    Updated/ annual due 6/19:  HM:  ref fluvax, 8/18 HPV#2, 5/11 TDaP.    Review of Systems   Constitutional: Negative for chills, diaphoresis, fatigue and fever.   Respiratory: Negative for cough, shortness of breath and wheezing.    Cardiovascular: Negative for chest pain and palpitations.   Gastrointestinal: Negative for abdominal pain, constipation, diarrhea, nausea and vomiting.   Endocrine: Negative for polydipsia, polyphagia and polyuria.   Genitourinary: Negative for dysuria.   Neurological: Negative for dizziness, syncope and weakness.   Psychiatric/Behavioral: Negative for confusion. The patient is not nervous/anxious.        Objective:      Physical Exam   Constitutional: She is oriented to person, place, and time. She appears well-developed and well-nourished.   HENT:   Head: Normocephalic.   Right Ear: External ear normal.   Left Ear: External ear normal.   Nose: Nose normal.   Mouth/Throat: Oropharynx is clear and moist.   Eyes: Conjunctivae and EOM are normal.   Neck: Normal range of motion. Neck supple.   Cardiovascular: Normal rate, regular rhythm and normal heart sounds. Exam reveals no gallop and no friction rub.   No murmur heard.  Pulmonary/Chest: Effort normal and breath sounds normal. She has no wheezes. She has no rales. She exhibits no tenderness.   Abdominal: Soft. Bowel sounds are normal.   Musculoskeletal: Normal range of motion.    Neurological: She is alert and oriented to person, place, and time.   Skin: Skin is warm and dry. She is not diaphoretic.   Psychiatric: She has a normal mood and affect. Her behavior is normal.       Assessment:       1. Anxiety and depression        Plan:     Alea was seen today for follow-up.    Diagnoses and all orders for this visit:    Anxiety and depression  Continue counseling and  F/u c psych.  -     FLUoxetine (PROZAC) 20 MG capsule; Take 1 capsule (20 mg total) by mouth once daily.  -     clonazePAM (KLONOPIN) 0.5 MG tablet; Take 1 tablet (0.5 mg total) by mouth once daily.

## 2018-10-12 ENCOUNTER — OFFICE VISIT (OUTPATIENT)
Dept: PSYCHIATRY | Facility: CLINIC | Age: 23
End: 2018-10-12
Payer: COMMERCIAL

## 2018-10-12 DIAGNOSIS — F41.0 PANIC DISORDER WITHOUT AGORAPHOBIA: Primary | ICD-10-CM

## 2018-10-12 PROCEDURE — 90834 PSYTX W PT 45 MINUTES: CPT | Mod: S$GLB,,, | Performed by: SOCIAL WORKER
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
## 2018-10-30 RX ORDER — CLONAZEPAM 0.5 MG/1
TABLET ORAL
Qty: 30 TABLET | Refills: 0 | Status: SHIPPED | OUTPATIENT
Start: 2018-10-30 | End: 2018-10-31 | Stop reason: SDUPTHER

## 2018-10-31 ENCOUNTER — OFFICE VISIT (OUTPATIENT)
Dept: PSYCHIATRY | Facility: CLINIC | Age: 23
End: 2018-10-31
Payer: COMMERCIAL

## 2018-10-31 DIAGNOSIS — F32.A ANXIETY AND DEPRESSION: Primary | ICD-10-CM

## 2018-10-31 DIAGNOSIS — F41.9 ANXIETY AND DEPRESSION: Primary | ICD-10-CM

## 2018-10-31 PROCEDURE — 99999 PR PBB SHADOW E&M-EST. PATIENT-LVL I: CPT | Mod: PBBFAC,,, | Performed by: PSYCHIATRY & NEUROLOGY

## 2018-10-31 PROCEDURE — 90792 PSYCH DIAG EVAL W/MED SRVCS: CPT | Mod: S$GLB,,, | Performed by: PSYCHIATRY & NEUROLOGY

## 2018-10-31 NOTE — PROGRESS NOTES
"Outpatient Psychiatry Initial Visit (MD/NP)    10/31/2018    Alea Cooley, a 23 y.o. female, presenting for initial evaluation visit. Met with patient.    Reason for Encounter: Patient complains of depression, anxiety.    History of Present Illness: 24 y/o F presents for establishment of care, reports intermittent treatment with medication since age 15. Describes episodic spells featuring overworry, simple things "get my brain stuck or fixated". Getting worked up over simple things. All of a sudden get really hot and hard to breathe, start crying. Problems functioning in personal and occupational roles - having difficulty completing work tasks due to problems with concentration (e.g. Losing track of counting at work). Takes meds but believes they're not working well. Says when not on meds has been unable to hold a job. "can't handle it". If forgets to take meds may have spell every 2-3 days. On meds consistently, spells happen every few weeks. After awhile felt concentration was getting worse even on meds and even though anxiety was mostly controlled. Added wellbutrin. Seems to have helped. Takes meds regularly. No side effects. Takes clonazepam - Started taking it once a day then more recently keeping them in reserve as a rescue med.     Psych Hx: from 15 to 18, in & out of psych hospital. About 6-7 times. First hospitalization in which she was paranoid, "thinking wasn't quite right". "dreamlike state of mind". First episode - synthetic MJ first time smoking it that day - "acting strange" and [place]. "asked a reji for his keys to get home". "had this idea that I was dreaming and I couldn't wake up". [facility]. "felt like my brain never was the same after that". Was prescribed antipsychotic, stopped it "because I didn't like the way it may me feel".     Concentration was a problem, "would feel lost in my own mind". Was hard to come to terms with how my brain " "changed. "got depressed a lot", "felt frustrated and helpless". Extremely unmotivated, self-critical for not being able to do things she thought she was able to do. "Didn't want to exist anymore". Had suicidal fantasies, "but I didn't think that I ever could". Has self-harmed by scratching/cutting self "because I was angry at myself". Would get kind of hysterical where I couldn't deal with my own emotions, crying and not able to talk to people without being overdramatic, mom encouraged hospitalization. "Also in kind of a delusional state", would get paranoid. Hadn't been using any drugs. [facility]nd hospitalization [facility]. Stayed about 1 week. Thinks doctors called it "depression with anxiety". Meds started, perhaps wellbutrin + abilify. Felt like it helped but not fully.     "made a lot of progress" with Dr. Centeno at [facility]. Depression improved with prozac. Dr. Centeno, but didn't have rapport with the new person. Was no longer eligible for medicaid. Insurance/access of care led her to be off medication. Had more symptoms off medicine, couldn't keep a job. Later was able to get a decent job and continue it. Parents eventually got her on their insurance and she's been seeing Dr. Ramirez since. "haven't gotten paranoid in a long time", not since age 18 or 19.     Describes pattern of increased emotionality (some , decreased sleep (sometimes decreased need, sometimes "being too afraid to go to sleep"), and then hallucinations when sleep deprived. Laughing for no reasons, for weird reasons. "for awhile they were calling it 'bipolar schizoaffective' (same diagnosis my grandmother had). Dr. Centeno tried other things. Last hospitalization was about 2 years ago.     7th, 8th grade - first time getting more paranoid. "thought people at school were talking about me and saying bad things about me".      MedHx: denies other health problems.   FamHx: grandmother - schizoaffective disorder (featuring paranoia, " "anxiety, AVH; multiple hospitalizations). Paternal great-grandfather "shell-shocked from the war")mom heard voices, had serious anxiety in 20's and 30's. "coped with it through reciting scriptures and saying comforting things to herself", "went away.     SocHx: from [place]. Grew up with both parents. Only child. No serious maltreatment. No developmental problems. Had problems with moods in context of being heavily criticized by teacher. "gained a lot of weight". Bullied. "used food to cope with things". Had to repeat 4th grade due to emotional problems, school avoidance. Then did home school next year but then failed leap test and mom realized she needed to go back to school. Started to lose weight in 5th grade. Obsessing about losing weight in middle school, around time started to have some paranoid thoughts. Was somewhat socially withdrawn, "not want to go anywhere". Always shy but "it went to another level". Didn't want to talk. "wanted to be invisible". Always thought I wasn't normal. "thought if I change the way I looked I could be normal". Started eating more again when running track, gained about 50 pounds. Was glad to notice people still wanted to be her friend. Mind wasn't same after psychotic episode with synthetic MJ - "sense of time was distorted", "felt like my mind wasn't grounded" (has gotten better over time). Was an "A/B student" before episode then some D's/F's after episode. "hard time understanding and concentrating" afterwards. Was pulled out of school and mom home-schooled her in 10th grade. Didn't have money to continue it in 11th grade, but started late so was going to have to repeat the grade. Got GED.     Lives with mom and dad. Good relationships, supportive. 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 Likes structured environments. Full-time work.     Review Of Systems:     GENERAL:  No weight gain or loss  SKIN:  No rashes or lacerations  HEAD:  No headaches  EYES:  No exophthalmos, " jaundice or blindness  EARS:  No dizziness, tinnitus or hearing loss  NOSE:  No changes in smell  MOUTH & THROAT:  No dyskinetic movements or obvious goiter  CHEST:  No shortness of breath, hyperventilation or cough  CARDIOVASCULAR:  No tachycardia or chest pain  ABDOMEN:  No nausea, vomiting, pain, constipation or diarrhea  URINARY:  No frequency, dysuria or sexual dysfunction  ENDOCRINE:  No polydipsia, polyuria  MUSCULOSKELETAL:  No pain or stiffness of the joints  NEUROLOGIC:  No weakness, sensory changes, seizures, confusion, memory loss, tremor or other abnormal movements    Current Evaluation:     Nutritional Screening: Considering the patient's height and weight, medications, medical history and preferences, should a referral be made to the dietitian? no    Constitutional  Vitals:  Most recent vital signs, dated less than 90 days prior to this appointment, were not reviewed.    There were no vitals filed for this visit.     General:  unremarkable, age appropriate     Musculoskeletal  Muscle Strength/Tone:  no tremor, no tic   Gait & Station:  non-ataxic     Psychiatric  Appearance: casually dressed & groomed;   Behavior: calm, somewhat odd manner  Cooperation: cooperative with assessment  Speech: normal rate, volume, tone  Thought Process: linear, goal-directed  Thought Content: No suicidal or homicidal ideation; no delusions  Affect: anxious  Mood: anxious  Perceptions: No auditory or visual hallucinations  Level of Consciousness: alert throughout interview  Insight: fair  Cognition: Oriented to person, place, time, & situation  Memory: no apparent deficits to general clinical interview; not formally assessed  Attention/Concentration: no apparent deficits to general clinical interview; not formally assessed  Fund of Knowledge: average by vocabulary/education    Laboratory Data  No visits with results within 1 Month(s) from this visit.   Latest known visit with results is:   Lab Visit on 04/25/2018    Component Date Value Ref Range Status    Preg Test, Ur 04/25/2018 Negative   Final     Medications  Outpatient Encounter Medications as of 10/31/2018   Medication Sig Dispense Refill    buPROPion (WELLBUTRIN XL) 150 MG TB24 tablet Take 1 tablet (150 mg total) by mouth once daily. 30 tablet 6    clonazePAM (KLONOPIN) 0.5 MG tablet TAKE 1 TABLET BY MOUTH ONCE DAILY IN THE EVENING 30 tablet 0    FLUoxetine (PROZAC) 20 MG capsule Take 1 capsule (20 mg total) by mouth once daily. 90 capsule 1    levonorgestrel-ethinyl estradiol (AVIANE,ALESSE,LESSINA) 0.1-20 mg-mcg per tablet Take 1 tablet by mouth once daily. 28 tablet 4     No facility-administered encounter medications on file as of 10/31/2018.      Assessment - Diagnosis - Goals:     Impression: 24 y/o F with chronic anxiety which is fairly pervasive, as well as episodic periods with more severe symptoms, depressive episodes, and cognitive symptoms, particularly following k2 use. Psychosis less prominent in recent years.     Dx: anxiety disorder, consider bipolar disorder.     Treatment Goals:  Specify outcomes written in observable, behavioral terms: reduce anxiety.     Treatment Plan/Recommendations:   · Clarify diagnoses. Continue Fluoxetine, bupropion. Clonazepam prn.  · Continue psychotherapy.   · Discussed risks, benefits, and alternatives to treatment plan documented above with patient. I answered all patient questions related to this plan and patient expressed understanding and agreement.     Return to Clinic: 2 months    Counseling time: 10 minutes  Total time: 50 minutes    KENNEDY Campos MD  Psychiatry  [facility]  [address] [place] [address] , [place] [address]  [phone]

## 2018-11-11 RX ORDER — CLONAZEPAM 0.5 MG/1
0.5 TABLET ORAL NIGHTLY
Qty: 30 TABLET | Refills: 0 | Status: SHIPPED | OUTPATIENT
Start: 2018-11-11 | End: 2019-01-12 | Stop reason: SDUPTHER

## 2018-12-12 ENCOUNTER — CLINICAL SUPPORT (OUTPATIENT)
Dept: INTERNAL MEDICINE | Facility: CLINIC | Age: 23
End: 2018-12-12
Payer: COMMERCIAL

## 2018-12-12 DIAGNOSIS — Z29.9 PREVENTIVE MEASURE: Primary | ICD-10-CM

## 2018-12-12 PROCEDURE — 90471 IMMUNIZATION ADMIN: CPT | Mod: S$GLB,,, | Performed by: INTERNAL MEDICINE

## 2018-12-12 PROCEDURE — 90651 9VHPV VACCINE 2/3 DOSE IM: CPT | Mod: S$GLB,,, | Performed by: INTERNAL MEDICINE

## 2018-12-12 PROCEDURE — 99999 PR PBB SHADOW E&M-EST. PATIENT-LVL I: CPT | Mod: PBBFAC,,,

## 2018-12-14 ENCOUNTER — OFFICE VISIT (OUTPATIENT)
Dept: PSYCHIATRY | Facility: CLINIC | Age: 23
End: 2018-12-14
Payer: COMMERCIAL

## 2018-12-14 DIAGNOSIS — F41.0 PANIC DISORDER WITHOUT AGORAPHOBIA: Primary | ICD-10-CM

## 2018-12-14 PROCEDURE — 90834 PSYTX W PT 45 MINUTES: CPT | Mod: S$GLB,,, | Performed by: SOCIAL WORKER

## 2018-12-17 NOTE — PROGRESS NOTES
Individual Psychotherapy (PhD/LCSW)    12/14/2018    [place]:  [place]         Therapeutic Intervention: Met with patient.  Outpatient - Insight oriented psychotherapy 45 min - CPT code 22364    Chief complaint/reason for encounter: anxiety     Interval history and content of current session:  Patient presents to ongoing individual therapy due to anxiety.  She was planning to move into an apartment with a friend.  The friend was unable to do so.  The patient began crunching the numbers and she realized she could afford the place herself.  She admits that she has had to be careful with her money.  Her apartment is not far from her parents.  She has friends who live in the same complex as herself.  Her parents have been supportive of the move.  She is planning to have a house warming party on New Year's Melba.  She decided to break up with her boyfriend.  She felt that they had different priorities.  She enjoys being social.  He would often isolate and play video games.  She denies dating anyone at this time.  She continues to work at [employer].  She has been surprised by the support of her co workers.  A co worker helped her to move with her 's truck.  She would not even accept gas money from the patient.  She does feel that she has opportunity for advancement at her job.  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  She is happy that she has decided to get her own place.    Treatment plan:  Target symptoms: anxiety   Why chosen therapy is appropriate versus another modality: relevant to diagnosis  Outcome monitoring methods: self-report, observation  Therapeutic intervention type: insight oriented psychotherapy, supportive psychotherapy, interactive psychotherapy     Risk parameters:  Patient reports no suicidal ideation  Patient reports no homicidal ideation  Patient reports no self-injurious behavior  Patient reports no violent behavior     Verbal  deficits: None     Patient's response to intervention:  The patient's response to intervention is accepting, motivated.     Progress toward goals and other mental status changes:  The patient's progress toward goals is fair .     Diagnosis:   Panic Disorder     Plan:  individual psychotherapy and medication management by physician     Return to clinic: as scheduled     Length of Service (minutes): 45

## 2019-01-14 RX ORDER — CLONAZEPAM 0.5 MG/1
TABLET ORAL
Qty: 30 TABLET | Refills: 0 | Status: SHIPPED | OUTPATIENT
Start: 2019-01-14 | End: 2019-01-21 | Stop reason: SDUPTHER

## 2019-01-21 ENCOUNTER — OFFICE VISIT (OUTPATIENT)
Dept: PSYCHIATRY | Facility: CLINIC | Age: 24
End: 2019-01-21
Payer: COMMERCIAL

## 2019-01-21 VITALS
SYSTOLIC BLOOD PRESSURE: 118 MMHG | BODY MASS INDEX: 23.58 KG/M2 | HEART RATE: 66 BPM | DIASTOLIC BLOOD PRESSURE: 80 MMHG | WEIGHT: 150.56 LBS

## 2019-01-21 DIAGNOSIS — F32.A ANXIETY AND DEPRESSION: ICD-10-CM

## 2019-01-21 DIAGNOSIS — F41.9 ANXIETY AND DEPRESSION: ICD-10-CM

## 2019-01-21 PROCEDURE — 3008F BODY MASS INDEX DOCD: CPT | Mod: CPTII,S$GLB,, | Performed by: PSYCHIATRY & NEUROLOGY

## 2019-01-21 PROCEDURE — 99214 OFFICE O/P EST MOD 30 MIN: CPT | Mod: S$GLB,,, | Performed by: PSYCHIATRY & NEUROLOGY

## 2019-01-21 PROCEDURE — 99999 PR PBB SHADOW E&M-EST. PATIENT-LVL II: CPT | Mod: PBBFAC,,, | Performed by: PSYCHIATRY & NEUROLOGY

## 2019-01-21 PROCEDURE — 99214 PR OFFICE/OUTPT VISIT, EST, LEVL IV, 30-39 MIN: ICD-10-PCS | Mod: S$GLB,,, | Performed by: PSYCHIATRY & NEUROLOGY

## 2019-01-21 PROCEDURE — 99999 PR PBB SHADOW E&M-EST. PATIENT-LVL II: ICD-10-PCS | Mod: PBBFAC,,, | Performed by: PSYCHIATRY & NEUROLOGY

## 2019-01-21 PROCEDURE — 3008F PR BODY MASS INDEX (BMI) DOCUMENTED: ICD-10-PCS | Mod: CPTII,S$GLB,, | Performed by: PSYCHIATRY & NEUROLOGY

## 2019-01-21 RX ORDER — BUPROPION HYDROCHLORIDE 150 MG/1
150 TABLET ORAL DAILY
Qty: 30 TABLET | Refills: 1 | Status: SHIPPED | OUTPATIENT
Start: 2019-01-21 | End: 2019-04-18 | Stop reason: SDUPTHER

## 2019-01-21 RX ORDER — CLONAZEPAM 0.5 MG/1
0.5 TABLET ORAL NIGHTLY
Qty: 30 TABLET | Refills: 2 | Status: SHIPPED | OUTPATIENT
Start: 2019-01-21 | End: 2019-04-18 | Stop reason: SDUPTHER

## 2019-01-21 NOTE — PROGRESS NOTES
"Outpatient Psychiatry Follow-up Visit (MD/NP)    1/21/2019    Alea Cooley, a 23 y.o. female, presenting for follow-up visit. Met with patient.    Reason for Encounter: Patient complains of depression, anxiety.    Interval History: Patient seen and interviewed for follow-up.   Reports some improvements - thinks she's doing better at managing her anxiety. Finding it easier to drive. Focusing better, time management at work. Health is ok. No new dx'es. No new medications. Taking meds daily including clonazepam once daily. Was very tense and anxiety more problematic when didn't have it. No side effects. Starting back in college.     Background: 24 y/o F presents for establishment of care, reports intermittent treatment with medication since age 15. Describes episodic spells featuring overworry, simple things "get my brain stuck or fixated". Getting worked up over simple things. All of a sudden get really hot and hard to breathe, start crying. Problems functioning in personal and occupational roles - having difficulty completing work tasks due to problems with concentration (e.g. Losing track of counting at work). Takes meds but believes they're not working well. Says when not on meds has been unable to hold a job. "can't handle it". If forgets to take meds may have spell every 2-3 days. On meds consistently, spells happen every few weeks. After awhile felt concentration was getting worse even on meds and even though anxiety was mostly controlled. Added wellbutrin. Seems to have helped. Takes meds regularly. No side effects. Takes clonazepam - Started taking it once a day then more recently keeping them in reserve as a rescue med.     Psych Hx: from 15 to 18, in & out of psych hospital. About 6-7 times. First hospitalization in which she was paranoid, "thinking wasn't quite right". "dreamlike state of mind". First episode - synthetic MJ first time smoking it that day - "acting strange" [place]. " ""asked a reji for his keys to get home". "had this idea that I was dreaming and I couldn't wake up". Hospitalized at [facility] for about a week. "felt like my brain never was the same after that". Was prescribed antipsychotic, stopped it "because I didn't like the way it may me feel".     Concentration was a problem, "would feel lost in my own mind". Was hard to come to terms with how my brain changed. "got depressed a lot", "felt frustrated and helpless". Extremely unmotivated, self-critical for not being able to do things she thought she was able to do. "Didn't want to exist anymore". Had suicidal fantasies, "but I didn't think that I ever could". Has self-harmed by scratching/cutting self "because I was angry at myself". Would get kind of hysterical where I couldn't deal with my own emotions, crying and not able to talk to people without being overdramatic, mom encouraged hospitalization. "Also in kind of a delusional state", would get paranoid. Hadn't been using any drugs. [facility]. Stayed about 1 week. Thinks doctors called it "depression with anxiety". Meds started, perhaps wellbutrin + abilify. Felt like it helped but not fully.     "made a lot of progress" with Dr. Centeno at [facility]. Depression improved with prozac. Dr. Centeno, but didn't have rapport with the new person. Was no longer eligible for medicaid. Insurance/access of care led her to be off medication. Had more symptoms off medicine, couldn't keep a job. Later was able to get a decent job and continue it. Parents eventually got her on their insurance and she's been seeing Dr. Ramirez since. "haven't gotten paranoid in a long time", not since age 18 or 19.     Describes pattern of increased emotionality (some , decreased sleep (sometimes decreased need, sometimes "being too afraid to go to sleep"), and then hallucinations when sleep deprived. Laughing for no reasons, for weird reasons. "for awhile they were " "calling it 'bipolar schizoaffective' (same diagnosis my grandmother had). Dr. Centeno tried other things. Last hospitalization was about 2 years ago.     7th, 8th grade - first time getting more paranoid. "thought people at school were talking about me and saying bad things about me".      MedHx: denies other health problems.   FamHx: grandmother - schizoaffective disorder (featuring paranoia, anxiety, AVH; multiple hospitalizations). Paternal great-grandfather "shell-shocked from the war")mom heard voices, had serious anxiety in 20's and 30's. "coped with it through reciting scriptures and saying comforting things to herself", "went away.     SocHx: from [place]. Grew up with both parents. Only child. No serious maltreatment. No developmental problems. Had problems with moods in context of being heavily criticized by teacher. "gained a lot of weight". Bullied. "used food to cope with things". Had to repeat 4th grade due to emotional problems, school avoidance. Then did home school next year but then failed leap test and mom realized she needed to go back to school. Started to lose weight in 5th grade. Obsessing about losing weight in middle school, around time started to have some paranoid thoughts. Was somewhat socially withdrawn, "not want to go anywhere". Always shy but "it went to another level". Didn't want to talk. "wanted to be invisible". Always thought I wasn't normal. "thought if I change the way I looked I could be normal". Started eating more again when running track, gained about 50 pounds. Was glad to notice people still wanted to be her friend. Mind wasn't same after psychotic episode with synthetic MJ - "sense of time was distorted", "felt like my mind wasn't grounded" (has gotten better over time). Was an "A/B student" before episode then some D's/F's after episode. "hard time understanding and concentrating" afterwards. Was pulled out of school and mom home-schooled her in 10th grade. Didn't have money " to continue it in 11th grade, but started late so was going to have to repeat the grade. Got GED.     Lives with mom and dad. Good relationships, supportive. 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 Likes structured environments. Full-time work.     Review Of Systems:     GENERAL:  No weight gain or loss  SKIN:  No rashes or lacerations  HEAD:  No headaches  EYES:  No exophthalmos, jaundice or blindness  EARS:  No dizziness, tinnitus or hearing loss  NOSE:  No changes in smell  MOUTH & THROAT:  No dyskinetic movements or obvious goiter  CHEST:  No shortness of breath, hyperventilation or cough  CARDIOVASCULAR:  No tachycardia or chest pain  ABDOMEN:  No nausea, vomiting, pain, constipation or diarrhea  URINARY:  No frequency, dysuria or sexual dysfunction  ENDOCRINE:  No polydipsia, polyuria  MUSCULOSKELETAL:  No pain or stiffness of the joints  NEUROLOGIC:  No weakness, sensory changes, seizures, confusion, memory loss, tremor or other abnormal movements    Current Evaluation:     Nutritional Screening: Considering the patient's height and weight, medications, medical history and preferences, should a referral be made to the dietitian? no    Constitutional  Vitals:  Most recent vital signs, dated less than 90 days prior to this appointment, were not reviewed.    There were no vitals filed for this visit.     General:  unremarkable, age appropriate     Musculoskeletal  Muscle Strength/Tone:  no tremor, no tic   Gait & Station:  non-ataxic     Psychiatric  Appearance: casually dressed & groomed;   Behavior: calm, somewhat odd manner  Cooperation: cooperative with assessment  Speech: normal rate, volume, tone  Thought Process: linear, goal-directed  Thought Content: No suicidal or homicidal ideation; no delusions  Affect: anxious  Mood: anxious  Perceptions: No auditory or visual hallucinations  Level of Consciousness: alert throughout interview  Insight: fair  Cognition: Oriented to person, place, time, &  situation  Memory: no apparent deficits to general clinical interview; not formally assessed  Attention/Concentration: no apparent deficits to general clinical interview; not formally assessed  Fund of Knowledge: average by vocabulary/education    Laboratory Data  No visits with results within 1 Month(s) from this visit.   Latest known visit with results is:   Lab Visit on 04/25/2018   Component Date Value Ref Range Status    Preg Test, Ur 04/25/2018 Negative   Final     Medications  Outpatient Encounter Medications as of 1/21/2019   Medication Sig Dispense Refill    buPROPion (WELLBUTRIN XL) 150 MG TB24 tablet Take 1 tablet (150 mg total) by mouth once daily. 30 tablet 6    clonazePAM (KLONOPIN) 0.5 MG tablet TAKE 1 TABLET BY MOUTH IN THE EVENING 30 tablet 0    FLUoxetine (PROZAC) 20 MG capsule Take 1 capsule (20 mg total) by mouth once daily. 90 capsule 1    levonorgestrel-ethinyl estradiol (AVIANE,ALESSE,LESSINA) 0.1-20 mg-mcg per tablet Take 1 tablet by mouth once daily. 28 tablet 4     No facility-administered encounter medications on file as of 1/21/2019.      Assessment - Diagnosis - Goals:     Impression: 22 y/o F with chronic anxiety which is fairly pervasive, as well as episodic periods with more severe symptoms, depressive episodes, and cognitive symptoms, particularly following k2 use. Psychosis less prominent in recent years.     Dx: anxiety disorder, consider bipolar disorder.     Treatment Goals:  Specify outcomes written in observable, behavioral terms: reduce anxiety.     Treatment Plan/Recommendations:   · Clarify diagnoses. Continue Fluoxetine, bupropion. Clonazepam prn.  · Continue psychotherapy.   · Discussed risks, benefits, and alternatives to treatment plan documented above with patient. I answered all patient questions related to this plan and patient expressed understanding and agreement.     Return to Clinic: 2 months    Counseling time: 10 minutes  Total time: 50 minutes    KENNEDY Hernandez  "MD Beth  Psychiatry  [facility]  [address] , [place] [address]  [phone]  Outpatient Psychiatry Initial Visit (MD/NP)    1/21/2019    Alea Cooley, a 23 y.o. female, presenting for initial evaluation visit. Met with patient.    Reason for Encounter: Patient complains of depression, anxiety.    History of Present Illness: 24 y/o F presents for establishment of care, reports intermittent treatment with medication since age 15. Describes episodic spells featuring overworry, simple things "get my brain stuck or fixated". Getting worked up over simple things. All of a sudden get really hot and hard to breathe, start crying. Problems functioning in personal and occupational roles - having difficulty completing work tasks due to problems with concentration (e.g. Losing track of counting at work). Takes meds but believes they're not working well. Says when not on meds has been unable to hold a job. "can't handle it". If forgets to take meds may have spell every 2-3 days. On meds consistently, spells happen every few weeks. After awhile felt concentration was getting worse even on meds and even though anxiety was mostly controlled. Added wellbutrin. Seems to have helped. Takes meds regularly. No side effects. Takes clonazepam - Started taking it once a day then more recently keeping them in reserve as a rescue med.     Psych Hx: from 15 to 18, in & out of psych hospital. About 6-7 times. First hospitalization in which she was paranoid, "thinking wasn't quite right". "dreamlike state of mind". First episode - synthetic MJ first time smoking it that day - "acting strange" [place]. "asked a reji for his keys to get home". "had this idea that I was dreaming and I couldn't wake up". [facility]. "felt like my brain never was the same after that". Was prescribed antipsychotic, stopped it "because I didn't like the way it may me feel". " "    Concentration was a problem, "would feel lost in my own mind". Was hard to come to terms with how my brain changed. "got depressed a lot", "felt frustrated and helpless". Extremely unmotivated, self-critical for not being able to do things she thought she was able to do. "Didn't want to exist anymore". Had suicidal fantasies, "but I didn't think that I ever could". Has self-harmed by scratching/cutting self "because I was angry at myself". Would get kind of hysterical where I couldn't deal with my own emotions, crying and not able to talk to people without being overdramatic, mom encouraged hospitalization. "Also in kind of a delusional state", would get paranoid. Hadn't been using any drugs. [facility] hospitalization at [facility]. Stayed about 1 week. Thinks doctors called it "depression with anxiety". Meds started, perhaps wellbutrin + abilify. Felt like it helped but not fully.     "made a lot of progress" with Dr. Centeno at [facility]. Depression improved with prozac. Dr. Centeno, but didn't have rapport with the new person. Was no longer eligible for medicaid. Insurance/access of care led her to be off medication. Had more symptoms off medicine, couldn't keep a job. Later was able to get a decent job and continue it. Parents eventually got her on their insurance and she's been seeing Dr. Ramirez since. "haven't gotten paranoid in a long time", not since age 18 or 19.     Describes pattern of increased emotionality (some , decreased sleep (sometimes decreased need, sometimes "being too afraid to go to sleep"), and then hallucinations when sleep deprived. Laughing for no reasons, for weird reasons. "for awhile they were calling it 'bipolar schizoaffective' (same diagnosis my grandmother had). Dr. Centeno tried other things. Last hospitalization was about 2 years ago.     7th, 8th grade - first time getting more paranoid. "thought people at school were talking about me and saying bad things about me".  " "    MedHx: denies other health problems.   FamHx: grandmother - schizoaffective disorder (featuring paranoia, anxiety, AVH; multiple hospitalizations). Paternal great-grandfather "shell-shocked from the war")mom heard voices, had serious anxiety in 20's and 30's. "coped with it through reciting scriptures and saying comforting things to herself", "went away.     SocHx: from . Grew up with both parents. Only child. No serious maltreatment. No developmental problems. Had problems with moods in context of being heavily criticized by teacher. "gained a lot of weight". Bullied. "used food to cope with things". Had to repeat 4th grade due to emotional problems, school avoidance. Then did home school next year but then failed leap test and mom realized she needed to go back to school. Started to lose weight in 5th grade. Obsessing about losing weight in middle school, around time started to have some paranoid thoughts. Was somewhat socially withdrawn, "not want to go anywhere". Always shy but "it went to another level". Didn't want to talk. "wanted to be invisible". Always thought I wasn't normal. "thought if I change the way I looked I could be normal". Started eating more again when running track, gained about 50 pounds. Was glad to notice people still wanted to be her friend. Mind wasn't same after psychotic episode with synthetic MJ - "sense of time was distorted", "felt like my mind wasn't grounded" (has gotten better over time). Was an "A/B student" before episode then some D's/F's after episode. "hard time understanding and concentrating" afterwards. Was pulled out of school and mom home-schooled her in 10th grade. Didn't have money to continue it in 11th grade, but started late so was going to have to repeat the grade. Got GED.     Lives with mom and dad. Good relationships, supportive. 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 Likes structured environments. Full-time work.     Review Of Systems: "     GENERAL:  No weight gain or loss  SKIN:  No rashes or lacerations  HEAD:  No headaches  EYES:  No exophthalmos, jaundice or blindness  EARS:  No dizziness, tinnitus or hearing loss  NOSE:  No changes in smell  MOUTH & THROAT:  No dyskinetic movements or obvious goiter  CHEST:  No shortness of breath, hyperventilation or cough  CARDIOVASCULAR:  No tachycardia or chest pain  ABDOMEN:  No nausea, vomiting, pain, constipation or diarrhea  URINARY:  No frequency, dysuria or sexual dysfunction  ENDOCRINE:  No polydipsia, polyuria  MUSCULOSKELETAL:  No pain or stiffness of the joints  NEUROLOGIC:  No weakness, sensory changes, seizures, confusion, memory loss, tremor or other abnormal movements    Current Evaluation:     Nutritional Screening: Considering the patient's height and weight, medications, medical history and preferences, should a referral be made to the dietitian? no    Constitutional  Vitals:  Most recent vital signs, dated less than 90 days prior to this appointment, were not reviewed.    There were no vitals filed for this visit.     General:  unremarkable, age appropriate     Musculoskeletal  Muscle Strength/Tone:  no tremor, no tic   Gait & Station:  non-ataxic     Psychiatric  Appearance: casually dressed & groomed;   Behavior: calm, somewhat odd manner  Cooperation: cooperative with assessment  Speech: normal rate, volume, tone  Thought Process: linear, goal-directed  Thought Content: No suicidal or homicidal ideation; no delusions  Affect: anxious  Mood: anxious  Perceptions: No auditory or visual hallucinations  Level of Consciousness: alert throughout interview  Insight: fair  Cognition: Oriented to person, place, time, & situation  Memory: no apparent deficits to general clinical interview; not formally assessed  Attention/Concentration: no apparent deficits to general clinical interview; not formally assessed  Fund of Knowledge: average by vocabulary/education    Laboratory Data  No visits with  results within 1 Month(s) from this visit.   Latest known visit with results is:   Lab Visit on 04/25/2018   Component Date Value Ref Range Status    Preg Test, Ur 04/25/2018 Negative   Final     Medications  Outpatient Encounter Medications as of 1/21/2019   Medication Sig Dispense Refill    buPROPion (WELLBUTRIN XL) 150 MG TB24 tablet Take 1 tablet (150 mg total) by mouth once daily. 30 tablet 6    clonazePAM (KLONOPIN) 0.5 MG tablet TAKE 1 TABLET BY MOUTH IN THE EVENING 30 tablet 0    FLUoxetine (PROZAC) 20 MG capsule Take 1 capsule (20 mg total) by mouth once daily. 90 capsule 1    levonorgestrel-ethinyl estradiol (AVIANE,ALESSE,LESSINA) 0.1-20 mg-mcg per tablet Take 1 tablet by mouth once daily. 28 tablet 4     No facility-administered encounter medications on file as of 1/21/2019.      Assessment - Diagnosis - Goals:     Impression: 22 y/o F with chronic anxiety which is fairly pervasive, as well as episodic periods with more severe symptoms, depressive episodes, and cognitive symptoms, particularly following k2 use. Psychosis less prominent in recent years. Symptoms ongoing but significantly improved on follow-up.     Dx: anxiety disorder, consider bipolar disorder.     Treatment Goals:  Specify outcomes written in observable, behavioral terms: reduce anxiety.     Treatment Plan/Recommendations:   · Continue bupropion, fluoxetine. Clonazepam prn.  · Continue psychotherapy.   · Discussed risks, benefits, and alternatives to treatment plan documented above with patient. I answered all patient questions related to this plan and patient expressed understanding and agreement.     Return to Clinic: 2 months    Counseling time: 10 minutes  Total time: 25 minutes    KENNEDY Campos MD  Psychiatry  [facility]  [address] , [place] [address]  [phone]

## 2019-02-08 ENCOUNTER — OFFICE VISIT (OUTPATIENT)
Dept: PSYCHIATRY | Facility: CLINIC | Age: 24
End: 2019-02-08
Payer: COMMERCIAL

## 2019-02-08 DIAGNOSIS — F41.0 PANIC DISORDER WITHOUT AGORAPHOBIA: Primary | ICD-10-CM

## 2019-02-08 PROCEDURE — 90834 PSYTX W PT 45 MINUTES: CPT | Mod: S$GLB,,, | Performed by: SOCIAL WORKER

## 2019-02-08 PROCEDURE — 90834 PR PSYCHOTHERAPY W/PATIENT, 45 MIN: ICD-10-PCS | Mod: S$GLB,,, | Performed by: SOCIAL WORKER

## 2019-02-08 NOTE — PROGRESS NOTES
Individual Psychotherapy (PhD/LCSW)    2/8/2019    Site:  [place]         Therapeutic Intervention: Met with patient.  Outpatient - Insight oriented psychotherapy 45 min - CPT code 93010    Chief complaint/reason for encounter: anxiety     Interval history and content of current session:  Patient presents to ongoing individual therapy due to anxiety.  She continues to live in her apartment.  She has been frustrated because she has had some water leaking into her apartment.  The complex cleared a drain and the water has not appeared again.  She has expressed her worry about mold to the complex.  They have gutted the apartment next door to her and cleared out the wall.  She tried to take in a cat, but her dog did not appreciate the company.  Her friend took the cat from her.  She enjoys time with her dog, who is protective of her.  She has started to date a reji she has known for three years.  They used to work at [employer] together.  They found out that they both have a crush on each other.  He lives in [place] and works at a candy company.  He has three years in college toward engineering.  He plans to return to school when he earns the money.  She is taking six hours at [employer].  She would like to become a dental assistant.  She does not think she could handle more that six hours with working a full time job.  She continues to work at [employer].  She is nervous about some upcoming changes at work.  Her manager will be leaving.  She is not sure who the new manager will be.  She admits she has been able to tell the difference between just being worried and being anxious.  She is encouraged to increase her physical activity.  In the past, she used to run cross country, but she has not run on a regular basis in some time.  Her parents continue to be supportive.  She does enjoy drawing.  She likes drawing people and animal.  She does not like drawing buildings.  She used to doodle a good bit  when she was in school.    Treatment plan:  Target symptoms: anxiety   Why chosen therapy is appropriate versus another modality: relevant to diagnosis  Outcome monitoring methods: self-report, observation  Therapeutic intervention type: insight oriented psychotherapy, supportive psychotherapy, interactive psychotherapy     Risk parameters:  Patient reports no suicidal ideation  Patient reports no homicidal ideation  Patient reports no self-injurious behavior  Patient reports no violent behavior     Verbal deficits: None     Patient's response to intervention:  The patient's response to intervention is accepting, motivated.     Progress toward goals and other mental status changes:  The patient's progress toward goals is fair .     Diagnosis:   Panic Disorder     Plan:  individual psychotherapy and medication management by physician     Return to clinic: as scheduled     Length of Service (minutes): 45

## 2019-02-14 ENCOUNTER — OFFICE VISIT (OUTPATIENT)
Dept: OBSTETRICS AND GYNECOLOGY | Facility: CLINIC | Age: 24
End: 2019-02-14
Payer: COMMERCIAL

## 2019-02-14 VITALS
HEIGHT: 67 IN | DIASTOLIC BLOOD PRESSURE: 70 MMHG | SYSTOLIC BLOOD PRESSURE: 122 MMHG | BODY MASS INDEX: 22.11 KG/M2 | WEIGHT: 140.88 LBS

## 2019-02-14 DIAGNOSIS — R31.9 HEMATURIA, UNSPECIFIED TYPE: Primary | ICD-10-CM

## 2019-02-14 DIAGNOSIS — N76.5 ULCER OF VAGINA: ICD-10-CM

## 2019-02-14 LAB
AMORPH CRY URNS QL MICRO: ABNORMAL
BACTERIA #/AREA URNS HPF: ABNORMAL /HPF
BILIRUB UR QL STRIP: NEGATIVE
CAOX CRY URNS QL MICRO: ABNORMAL
CLARITY UR: ABNORMAL
COLOR UR: YELLOW
GLUCOSE UR QL STRIP: NEGATIVE
HGB UR QL STRIP: ABNORMAL
KETONES UR QL STRIP: NEGATIVE
LEUKOCYTE ESTERASE UR QL STRIP: ABNORMAL
MICROSCOPIC COMMENT: ABNORMAL
NITRITE UR QL STRIP: NEGATIVE
PH UR STRIP: 6 [PH] (ref 5–8)
PROT UR QL STRIP: ABNORMAL
RBC #/AREA URNS HPF: 15 /HPF (ref 0–4)
SP GR UR STRIP: >=1.03 (ref 1–1.03)
SQUAMOUS #/AREA URNS HPF: 20 /HPF
URN SPEC COLLECT METH UR: ABNORMAL
WBC #/AREA URNS HPF: 80 /HPF (ref 0–5)

## 2019-02-14 PROCEDURE — 99999 PR PBB SHADOW E&M-EST. PATIENT-LVL III: CPT | Mod: PBBFAC,,, | Performed by: NURSE PRACTITIONER

## 2019-02-14 PROCEDURE — 3008F PR BODY MASS INDEX (BMI) DOCUMENTED: ICD-10-PCS | Mod: CPTII,S$GLB,, | Performed by: NURSE PRACTITIONER

## 2019-02-14 PROCEDURE — 99999 PR PBB SHADOW E&M-EST. PATIENT-LVL III: ICD-10-PCS | Mod: PBBFAC,,, | Performed by: NURSE PRACTITIONER

## 2019-02-14 PROCEDURE — 99214 OFFICE O/P EST MOD 30 MIN: CPT | Mod: 25,S$GLB,, | Performed by: NURSE PRACTITIONER

## 2019-02-14 PROCEDURE — 81002 PR URINALYSIS NONAUTO W/O SCOPE: ICD-10-PCS | Mod: S$GLB,,, | Performed by: NURSE PRACTITIONER

## 2019-02-14 PROCEDURE — 99214 PR OFFICE/OUTPT VISIT, EST, LEVL IV, 30-39 MIN: ICD-10-PCS | Mod: 25,S$GLB,, | Performed by: NURSE PRACTITIONER

## 2019-02-14 PROCEDURE — 3008F BODY MASS INDEX DOCD: CPT | Mod: CPTII,S$GLB,, | Performed by: NURSE PRACTITIONER

## 2019-02-14 PROCEDURE — 87529 HSV DNA AMP PROBE: CPT

## 2019-02-14 PROCEDURE — 81000 URINALYSIS NONAUTO W/SCOPE: CPT

## 2019-02-14 PROCEDURE — 81002 URINALYSIS NONAUTO W/O SCOPE: CPT | Mod: S$GLB,,, | Performed by: NURSE PRACTITIONER

## 2019-02-14 PROCEDURE — 87086 URINE CULTURE/COLONY COUNT: CPT

## 2019-02-14 RX ORDER — VALACYCLOVIR HYDROCHLORIDE 500 MG/1
500 TABLET, FILM COATED ORAL 2 TIMES DAILY
Qty: 20 TABLET | Refills: 0 | Status: SHIPPED | OUTPATIENT
Start: 2019-02-14 | End: 2019-02-27

## 2019-02-14 NOTE — PATIENT INSTRUCTIONS
Herpes  If you have herpes, youre not alone. Millions of Americans have it. Herpes has no cure. But you can control it and learn how to protect yourself and others from outbreaks.  What is herpes?  Herpes is a chronic (lifelong) virus. It can cause sores and discomfort. You get it from contact with someone who carries the virus. If sores occur on the lips, you have oral herpes. If sores occur on the penis or around the vagina, you have genital herpes.  Herpes outbreaks  · The first outbreak of herpes sores is usually the most severe. Then, the soldiers of the bodys immune system, white blood cells, produce antibodies. These antibodies help neutralize the herpes virus and may help make future attacks less severe.  · Some people have only one outbreak of sores. Some people have periods of frequent outbreaks (every few weeks). Outbreaks of herpes sores usually happen less often over time.  · Herpes sores may appear without a cause. Outbreaks are more likely when the immune system is weak. Other viral infections (such as a cold) can cause outbreaks. Stress from a poor diet, fatigue, or emotional upset can lead to outbreaks of sores. Exposure to strong sunlight often causes herpes sores to reappear.   To help prevent outbreaks  · To prevent oral herpes outbreaks, avoid overexposure to wind, sun, and extreme temperatures. Use sunscreen and lip balm on affected areas.  · If you are having frequent outbreaks, ask your healthcare provider about medicines that can help prevent outbreaks.  How herpes spreads to others  Herpes can be spread during an outbreak. But even without sores present, you can still shed the virus and infect others. You can take steps to prevent this.  To protect yourself and others  · If you have an oral sore, avoid kissing and oral-genital contact.  · If you have a genital sore, avoid intercourse. Also avoid oral-genital contact.  · Wash your hands after touching a sore.  · Use a condom each time  you have sex. You can pass the virus even when sores arent present. If youre unsure about the timing of certain kinds of physical contact, ask your health care provider.  · Tell any new partners that you have herpes.  · If youre a woman, have Pap tests as often as your healthcare provider recommends.  · A woman can spread herpes to their  during the birth process, whether or not they have an active genital sore. If pregnant, don't forget to tell your healthcare provider early in the pregnancy.   · In some cases, daily antiviral medicine (acyclovir, famcyclovir, or valavyclovir), in addition to consistent condom use, may reduce your chances of spreading herpes to an uninfected partner. Ask your healthcare provider if this medicine would be helpful for you.  Resources  [employer] STD Hotline  463.953.1447  www.ashastd.org  Centers for Disease Control and Prevention  353.547.5140  www.cdc.gov/std   Date Last Reviewed: 2016 [employer]. [address] [place] [address]. All rights reserved. This information is not intended as a substitute for professional medical care. Always follow your healthcare professional's instructions.        Genital Herpes    Genital herpes is a common sexually transmitted disease (STD). It is caused by the herpes simplex virus (HSV). One out of five teens and adults carry the herpes virus. During an outbreak, it causes small blisters that break open, leaving small, painful sores in the genital area. Eventually, scabs form and the sores heal. In women, these show up most often on the skin just outside the vaginal opening. They can occur on the buttocks, anus, or cervix. In men, the sores are usually on the tip, sides, or base of the penis. They also occur on the scrotum, buttocks, or thighs.  The first outbreak begins within 2 to 3 weeks after exposure to an infected sexual partner. It may last 1 to 3 weeks. It may cause  headache, muscle ache, and fevers. The first outbreak is usually the worst. Because the virus remains in the body even after the sores heal, most people will have more outbreaks. The frequency of outbreaks is different for each person. Some people will never have another outbreak. Others will have several episodes a year. Later outbreaks are usually shorter, milder, and less painful. For many, the number of outbreaks tends to decrease over time. Various factors may trigger an outbreak. These include:  · Emotional stress  · Menstruation  · Presence of another illness (cold, flu, or fever from any cause)  · Overexertion and fatigue  · Weakened immune system  Home care  · It is very important that you do not have sexual relations until all the herpes sores have healed completely.  · Wash the affected area gently with mild soap and water. Wash your hands after touching the affected area.  · You may use over-the-counter pain medicine unless another pain medicine was prescribed. (Note: If you have chronic liver or kidney disease or have ever had a stomach ulcer or gastrointestinal bleeding, talk with your healthcare provider before using these medicines. Also talk to your provider if you are taking medicine to prevent blood clots.) Aspirin should never be given to anyone younger than 18 years of age who is ill with a viral infection or fever. It may cause severe liver or brain damage.  · Your healthcare provider may prescribe antiviral medicine during the first outbreak. This will help the sores heal faster. Antiviral medicine may also be prescribed so that you have it ready to take at the first sign of another outbreak. This will help the symptoms go away sooner. For people with frequent outbreaks, daily therapy may be prescribed. This will help reduce the frequency of attacks. Daily therapy may also reduce risk of spread of herpes to your sexual partner. Discuss the risks and benefits of daily therapy with your  healthcare provider.  · If you are a woman who is pregnant now or may become pregnant in the future, let your healthcare provider know that you have had herpes. This may affect the way your baby is delivered.  Preventing spread to others  The virus is spread by sexual contact with someone who has the herpes virus. The risk of spread is highest when the sores are present. However, there is a chance of spreading the virus even when sores are not visible. Inform future sexual partners that you have herpes and that they may become infected. To reduce the risk of passing the virus to a partner who has never had herpes, avoid sexual relations at the first sign of an outbreak and until the sores are fully healed. Latex barriers, such as condoms, reduce the risk of spread between outbreaks if the infected site is covered, but they do not guarantee protection.  Follow-up care  Follow up with your healthcare provider, or as advised.  People who have just learned that they have herpes may feel upset. Getting the facts about herpes can help you feel more in control. Follow up with your healthcare provider or the public health department for complete STD screening, including HIV testing. For more information about herpes, visit the Centers for Disease Control (CDC) Genital Herpes website at: www.cdc.gov/std/Herpes/default.htm.  When to seek medical advice  Call your healthcare provider right away if any of these occur:  · Inability to urinate due to pain  · Swelling or increasing redness in the genital area  · Unusual drowsiness, weakness, or confusion  · Headache or stiff neck  · Discharge from the vagina or penis  · Increasing back or abdominal pain  · Rash or joint pain  Date Last Reviewed: 9/25/2015 © 2000-2017 [employer]. [address] [place] [address], [place] [address]. All rights reserved. This information is not intended as a substitute for professional medical care. Always follow your healthcare professional's  instructions.        Herpes Simplex Virus Antibody  Does this test have other names?  HSV-1 antibodies, HSV-2 antibodies  What is this test?  The herpes simplex virus antibodies test is a blood test that screens for the herpes simplex virus (HSV). Culturing a sample from an active outbreak of HSV is the best method to diagnose a current infection. But the herpes simplex virus antibodies test can help identify the recurrence of a previous infection.  Why do I need this test?  If you suspect that you have herpes but do not have an active infection, the antibody blood test can help make the diagnosis.  You may also have this test if you have HIV, or are pregnant or hope to become pregnant. The herpes simplex virus antibodies test screens for current or previous HSV infections.  In some cases, the herpes simplex virus antibodies test can be used to diagnose an active HSV infection. But more often, a herpes culture is used.  The antibodies test is valuable because many initial herpes infections show no symptoms. If symptoms do occur, they can include tenderness, as well as pain or burning at the site of the infection. This usually occurs before the outbreak of sores. You may also have headache, fever, achiness, or pain.  What other tests might I have along with this test?  If you have an active herpes infection, you may also need a physical exam so your healthcare provider can visually inspect the sores. Your healthcare provider may collect a sample from the sores to culture in a lab.  What do my test results mean?  Many things may affect your lab test results. These include the method each lab uses to do the test. Even if your test results are different from the normal value, you may not have a problem. To learn what the results mean for you, talk with your healthcare provider.  If your test result is positive, it can mean that you have an active herpes infection without symptoms. It can also mean that you had an HSV  infection in the past. The antibody blood test is not as reliable as culturing a sample from a herpes sore. But in a herpes infection without symptoms, it can be a useful method for finding out if you have an infection.  How is this test done?  The test requires a blood sample, which is drawn through a needle from a vein in your arm.  Does this test pose any risks?  Taking a blood sample with a needle carries risks that include bleeding, infection, bruising, or feeling dizzy. When the needle pricks your arm, you may feel a slight stinging sensation or pain. Afterward, the site may be slightly sore.  What might affect my test results?  An antibody test for HSV is not as reliable as culturing a sample from an active herpes outbreak because the results are not always easy to interpret. A positive test result can mean you have an active infection, or simply that you were exposed to the virus at some point in the past.  How do I get ready for this test?  You don't need to prepare for this test. But be sure your doctor knows about all medicines, herbs, vitamins, and supplements you are taking. This includes medicines that don't need a prescription and any illicit drugs you may use.  © 5305-8084 [employer]. [address] [place] [address], [place] [address]. All rights reserved. This information is not intended as a substitute for professional medical care. Always follow your healthcare professional's instructions.        Herpes Simplex Virus Culture and Typing  Does this test have other names?  Viral isolation  What is this test?  This test looks for which type of herpes simplex virus (HSV) is causing your infection.  HSV is a common virus that comes in two types: HSV1 and HSV2. Each type of HSV causes a number of health problems. Viral culture is the best test to confirm a herpes simplex infection.  HSV1 is more common. It's carried in saliva and typically causes outbreaks of cold sores around the mouth.  HSV2  affects the genitals and is spread by sexual contact. HSV1 can cause genital outbreaks, too, often from oral sex. Herpes sores can also develop on the hands and buttocks, around the eyes, and across large areas of the body.  This test works better in people having a first outbreak of HSV rather than those with recurring infections.  Why do I need this test?  You may need this test to find out whether you have a herpes infection. With outbreaks on the face, symptoms can include:  · Sore throat  · Tiredness  · Muscle soreness  · Swollen lymph nodes in the neck  · Sores on the lips, tongue, face, palate, and gums  Genital HSV infections can cause:  · Fever  · Headache  · Tiredness  · Muscle soreness  · Painful urination  · Itching  · Discharge from the penis or vagina  · Genital sores  You may also need this test if you have symptoms of meningitis, or inflammation of the protective membranes covering the brain. HSV can also cause meningitis. Symptoms include:  · Stiff neck  · Fever  · Headache  · Vomiting  · Photophobia, or eye pain when looking at light  You may also need this test if you have symptoms of encephalitis, or brain inflammation. Symptoms include:  · Fever  · Difficulty thinking clearly  · Flushing  · Sweating  · Changes in taste and smell  What other tests might I have along with this test?  Your healthcare provider may also order other tests to check for:  · HSV DNA in sores  · Antibodies against HSV in your blood  If your doctor suspects that your brain has been affected by the infection, he or she may order a viral DNA test of your cerebrospinal fluid, the fluid around your brain and spinal cord.  What do my test results mean?  Many things may affect your lab test results. These include the method each lab uses to do the test. Even if your test results are different from the normal value, you may not have a problem. To learn what the results mean for you, talk with your healthcare provider.  Test  results are generally available in 1-2 days. Normal results are negative, meaning that no HSV was found in your sample. Positive results mean that HSV was found. The results may also show which type of HSV you have.  How is this test done?  This test requires a sample of fluid from a herpes sore or from genital secretions. Your doctor will collect the sample by gently pressing a soft swab into one or more sores, or placing the swab on the tip of the penis or in the vagina.  Does this test pose any risks?  You may feel discomfort when the doctor takes the sample from a sore.  What might affect my test results?  Washing the sores with certain cleansers, including alcohol, may kill the virus and affect your results. If sores have started to heal, they may be less likely to have the virus.  How do I get ready for this test?  You don't need to prepare for this test.  © 0587-4531 [employer]. [address] [place] [address]. All rights reserved. This information is not intended as a substitute for professional medical care. Always follow your healthcare professional's instructions.        The Herpes Virus  Herpes is a virus that can cause sores on the skin. There are 2 types of the virus. Depending on how you come in contact with the virus, either type can cause outbreaks near the mouth or on the sex organs.  Understanding the herpes virus  Herpes reproduces only when it is inside the body. It does so by tricking a healthy cell into producing copies of the herpes virus. Each copy can infect nearby cells. But, before too long, the bodys defenses rally to stop the attack. The immune system forces the virus to retreat. Even then, the virus stays inside the body but does not cause disease. For some people, an acute outbreak never happens again. For others, outbreaks are more likely to occur due to menstruation, illness, poor diet, fatigue, exposure to cold or strong sunlight, or stress.    How the  herpes virus attacks  1. The herpes virus enters the body through a small break in the skin. The virus can also enter by direct contact with mucous membranes, such as those of the lips, vagina, or anus.  2. Inside the body, the herpes virus binds to a special site on a skin cell. Then part of the virus moves into the cell.  3. Inside the skin cell, the virus releases a set of instructions. These commands cause the cell to begin making copies of the herpes virus.  4. Herpes blisters appear on the skin. Herpes blisters may also appear on mucous membranes lining the mouth, vagina, or anus.  Date Last Reviewed: 1/1/2017 © 2000-2017 [employer]. [address] [place] [address], [place] [address]. All rights reserved. This information is not intended as a substitute for professional medical care. Always follow your healthcare professional's instructions.        Diagnosing Herpes  You will be asked about your health history. You may be asked about your eating and sleeping habits and sexual history. Mention if you have sores or if you have had any in the past. Also mention if you feel tingling or itching before an outbreak.  What a sore looks like        A herpes sore may first appear as a small white blister. The fluid inside the blister is filled with the herpes virus. At this stage the virus sheds easily. This means it can be passed to other people.   A soft wet ulcer may form in place of the blister. The herpes virus is in the fluid of the open sore. As a result, the virus can still be spread to others.                 A soft crust forms as a new layer of skin grows. Fewer copies of the virus are present in the sore.   The skin surface is normal, but the virus remains in the body. Shedding is less likely, but it can still occur.      Testing for herpes  If herpes is suspected, tests such as these may be done to confirm the diagnosis:  · Viral culture. A small amount of fluid is swabbed from the base of a blister.  The fluid is grown in a special culture with healthy cells. If herpes is present, it will alter the look of the cells.  · Fluorescent antibody test. Cells are taken from the base of a blister. They are stained and checked under a microscope. If herpes is present, the cells will change color.  · Molecular amplification. A sample of fluid suspected of containing herpes virus is mixed with chemicals that allow pieces of the virus to multiply very quickly. These viral fragments can be detected very rapidly.  · Other tests. If sores are not present, tests can be run on blood or cell samples. These tests show if you carry the herpes virus.   Date Last Reviewed: 1/1/2017 © 2000-2017 [employer]. [address] [place] [address], [place] [address]. All rights reserved. This information is not intended as a substitute for professional medical care. Always follow your healthcare professional's instructions.        Living with Herpes  To speed healing, take care of open herpes sores. To reduce outbreaks, take care of your health. And to keep from infecting others, learn how to avoid spreading the virus.     To ease symptoms  · Start episodic treatment at the first sign of symptoms, such as itching or tingling.  · Take ibuprofen or acetaminophen to limit any pain.  · Sit in a warm or cool bath or use a moist compress to lessen the itching of sores. For some women, genital outbreaks cause burning during urination. In such cases, urinating in a tub of warm water helps reduce burning.  · Wear white cotton underwear and loose clothing during outbreaks. Dont wear nylon underwear or tight clothes. They can prevent sores from healing.     To speed healing  · Wash sores with mild soap and water. Pat (don't rub) the sores completely dry.  · Always wash your hands after touching a sore.  · Dont bandage sores. Air helps them heal.  · Avoid using any ointment unless it is prescribed. Applying the wrong jelly or cream may hold in  moisture and slow healing.  · Dont pick at the sores. This can slow healing, and might cause a sore to become infected.  · If you wear contacts, wash your hands well before putting them in.     To reduce outbreaks  · Eat a balanced diet. Your health care provider may suggest taking supplements. These help ensure that you get all the nutrients you need.  · Get plenty of sleep. This helps your immune system work its best.  · Limit stress and tension. Both can weaken the bodys defenses.  · Limit exposure to sun, wind, and extreme heat or cold. Wear sunscreen and lip balm to help prevent outbreaks.     To protect others  · Tell your current sex partner and any future partners that you have herpes. If you dont know what to say, ask your healthcare provider for help.  · Use a latex condom that covers the affected areas each time you have sex. This reduces the risk of passing herpes to your partner.  · Avoid kissing when you have an oral sore.  · Do not have intercourse when genital sores are present. Also keep in mind, herpes can be passed during oral sex and with anal contact.  · Dont share towels, toothbrushes, lip balm, or lipstick when you have a sore.  · If you have very frequent outbreaks, taking daily antiviral medicines can help reduce the likelihood of transmission to your partner.  Date Last Reviewed: 1/1/2017  © 8342-6018 [employer]. [address] [place] [address], [place] [address]. All rights reserved. This information is not intended as a substitute for professional medical care. Always follow your healthcare professional's instructions.

## 2019-02-14 NOTE — PROGRESS NOTES
"Alea Cooley is a 24 y.o. female  presents with complaint of noting blood in her urine and rash to vaginal area - pain only to area of rash for last week with both. Sexually active with new partner.     Past Medical History:   Diagnosis Date    Anxiety and depression      History reviewed. No pertinent surgical history.  Social History     Tobacco Use    Smoking status: Never Smoker    Smokeless tobacco: Never Used   Substance Use Topics    Alcohol use: Yes     Comment: occ    Drug use: No     Family History   Problem Relation Age of Onset    Diabetes Paternal Grandfather     Diabetes Paternal Grandmother     Diabetes Maternal Grandmother      OB History    Para Term  AB Living   0 0 0 0 0 0   SAB TAB Ectopic Multiple Live Births   0 0 0 0 0             /70   Ht 5' 7" (1.702 m)   Wt 63.9 kg (140 lb 14 oz)   LMP 2019 (Approximate)   BMI 22.06 kg/m²     ROS:  Per hpi    PHYSICAL EXAM:  VULVA: ulcerated area to upper labia majora HSV culture taken    ASSESSMENT and PLAN:  1. Hematuria, unspecified type  POCT urine dipstick without microscope    Urinalysis    Urine culture     Urine dip with trace blood, 2+ leukocytes  Sending urine to lab    Patient was counseled today on possible herpes   "

## 2019-02-16 LAB
HSV1 DNA SPEC QL NAA+PROBE: NEGATIVE
HSV2 DNA SPEC QL NAA+PROBE: NEGATIVE
SPECIMEN SOURCE: NORMAL

## 2019-02-17 ENCOUNTER — PATIENT MESSAGE (OUTPATIENT)
Dept: OBSTETRICS AND GYNECOLOGY | Facility: CLINIC | Age: 24
End: 2019-02-17

## 2019-02-17 LAB — BACTERIA UR CULT: NORMAL

## 2019-02-25 ENCOUNTER — OFFICE VISIT (OUTPATIENT)
Dept: INTERNAL MEDICINE | Facility: CLINIC | Age: 24
End: 2019-02-25
Payer: COMMERCIAL

## 2019-02-25 ENCOUNTER — LAB VISIT (OUTPATIENT)
Dept: LAB | Facility: HOSPITAL | Age: 24
End: 2019-02-25
Attending: NURSE PRACTITIONER
Payer: COMMERCIAL

## 2019-02-25 VITALS
BODY MASS INDEX: 22.43 KG/M2 | WEIGHT: 142.88 LBS | SYSTOLIC BLOOD PRESSURE: 122 MMHG | HEIGHT: 67 IN | HEART RATE: 82 BPM | OXYGEN SATURATION: 98 % | TEMPERATURE: 96 F | DIASTOLIC BLOOD PRESSURE: 72 MMHG

## 2019-02-25 DIAGNOSIS — N89.8 VAGINAL DISCHARGE: ICD-10-CM

## 2019-02-25 DIAGNOSIS — L03.316 NAVEL CELLULITIS: Primary | ICD-10-CM

## 2019-02-25 DIAGNOSIS — R39.9 UTI SYMPTOMS: ICD-10-CM

## 2019-02-25 LAB
BACTERIA #/AREA URNS HPF: ABNORMAL /HPF
BILIRUB UR QL STRIP: NEGATIVE
CLARITY UR: ABNORMAL
COLOR UR: YELLOW
GLUCOSE UR QL STRIP: NEGATIVE
HGB UR QL STRIP: NEGATIVE
KETONES UR QL STRIP: ABNORMAL
LEUKOCYTE ESTERASE UR QL STRIP: ABNORMAL
MICROSCOPIC COMMENT: ABNORMAL
NITRITE UR QL STRIP: NEGATIVE
PH UR STRIP: 7 [PH] (ref 5–8)
PROT UR QL STRIP: ABNORMAL
RBC #/AREA URNS HPF: 3 /HPF (ref 0–4)
SP GR UR STRIP: 1.02 (ref 1–1.03)
SQUAMOUS #/AREA URNS HPF: 15 /HPF
URN SPEC COLLECT METH UR: ABNORMAL
WBC #/AREA URNS HPF: 30 /HPF (ref 0–5)

## 2019-02-25 PROCEDURE — 3008F BODY MASS INDEX DOCD: CPT | Mod: CPTII,S$GLB,, | Performed by: NURSE PRACTITIONER

## 2019-02-25 PROCEDURE — 99214 OFFICE O/P EST MOD 30 MIN: CPT | Mod: 25,S$GLB,, | Performed by: NURSE PRACTITIONER

## 2019-02-25 PROCEDURE — 99214 PR OFFICE/OUTPT VISIT, EST, LEVL IV, 30-39 MIN: ICD-10-PCS | Mod: 25,S$GLB,, | Performed by: NURSE PRACTITIONER

## 2019-02-25 PROCEDURE — 3008F PR BODY MASS INDEX (BMI) DOCUMENTED: ICD-10-PCS | Mod: CPTII,S$GLB,, | Performed by: NURSE PRACTITIONER

## 2019-02-25 PROCEDURE — 99999 PR PBB SHADOW E&M-EST. PATIENT-LVL III: CPT | Mod: PBBFAC,,, | Performed by: NURSE PRACTITIONER

## 2019-02-25 PROCEDURE — 81000 URINALYSIS NONAUTO W/SCOPE: CPT

## 2019-02-25 PROCEDURE — 87491 CHLMYD TRACH DNA AMP PROBE: CPT

## 2019-02-25 PROCEDURE — 96372 THER/PROPH/DIAG INJ SC/IM: CPT | Mod: S$GLB,,, | Performed by: NURSE PRACTITIONER

## 2019-02-25 PROCEDURE — 99999 PR PBB SHADOW E&M-EST. PATIENT-LVL III: ICD-10-PCS | Mod: PBBFAC,,, | Performed by: NURSE PRACTITIONER

## 2019-02-25 PROCEDURE — 96372 PR INJECTION,THERAP/PROPH/DIAG2ST, IM OR SUBCUT: ICD-10-PCS | Mod: S$GLB,,, | Performed by: NURSE PRACTITIONER

## 2019-02-25 RX ORDER — CEFTRIAXONE 250 MG/1
250 INJECTION, POWDER, FOR SOLUTION INTRAMUSCULAR; INTRAVENOUS
Status: COMPLETED | OUTPATIENT
Start: 2019-02-25 | End: 2019-02-25

## 2019-02-25 RX ORDER — DOXYCYCLINE 100 MG/1
100 CAPSULE ORAL EVERY 12 HOURS
Qty: 14 CAPSULE | Refills: 0 | Status: SHIPPED | OUTPATIENT
Start: 2019-02-25 | End: 2020-12-28

## 2019-02-25 RX ADMIN — CEFTRIAXONE 250 MG: 250 INJECTION, POWDER, FOR SOLUTION INTRAMUSCULAR; INTRAVENOUS at 03:02

## 2019-02-25 NOTE — PROGRESS NOTES
"Subjective:       Patient ID: Alea Cooley is a 24 y.o. female.    Chief Complaint: Rash; Back Pain; and Pelvic Pain    HPI     Pt reports greenish vaginal discharge, pelvic discomfort, and low back pain x 1 month. She reports that the discharge smells like "bleach".  Pt reports that she was seen by GYN a month ago and told that a genital rash she had was herpes. She was given valtrex, but the cultures were negative. She reports a recent new sex partner with condom use.\      Navel discharge- she reports greenish discharge and irritation of her belly button x 1 week.          Past Medical History:   Diagnosis Date    Anxiety and depression      History reviewed. No pertinent surgical history.  History reviewed. No pertinent surgical history.  Social History     Socioeconomic History    Marital status: Single     Spouse name: Not on file    Number of children: Not on file    Years of education: Not on file    Highest education level: Not on file   Social Needs    Financial resource strain: Not on file    Food insecurity - worry: Not on file    Food insecurity - inability: Not on file    Transportation needs - medical: Not on file    Transportation needs - non-medical: Not on file   Occupational History    Not on file   Tobacco Use    Smoking status: Never Smoker    Smokeless tobacco: Never Used   Substance and Sexual Activity    Alcohol use: Yes     Comment: occ    Drug use: No    Sexual activity: Yes     Partners: Male     Birth control/protection: None   Other Topics Concern    Not on file   Social History Narrative    Not on file     Review of patient's allergies indicates:  No Known Allergies  Current Outpatient Medications   Medication Sig    buPROPion (WELLBUTRIN XL) 150 MG TB24 tablet Take 1 tablet (150 mg total) by mouth once daily.    clonazePAM (KLONOPIN) 0.5 MG tablet Take 1 tablet (0.5 mg total) by mouth every evening.    FLUoxetine (PROZAC) 20 MG capsule Take 1 capsule (20 mg total) " by mouth once daily.    valACYclovir (VALTREX) 500 MG tablet Take 1 tablet (500 mg total) by mouth 2 (two) times daily. for 10 days    doxycycline (MONODOX) 100 MG capsule Take 1 capsule (100 mg total) by mouth every 12 (twelve) hours.     No current facility-administered medications for this visit.            Review of Systems   Constitutional: Negative for activity change, appetite change, chills, diaphoresis, fatigue, fever and unexpected weight change.   HENT: Negative for congestion, ear pain, postnasal drip, rhinorrhea, sinus pressure, sinus pain, sneezing, sore throat, tinnitus, trouble swallowing and voice change.    Eyes: Negative for photophobia, pain and visual disturbance.   Respiratory: Negative for cough, chest tightness, shortness of breath and wheezing.    Cardiovascular: Negative for chest pain, palpitations and leg swelling.   Gastrointestinal: Negative for abdominal distention, abdominal pain, constipation, diarrhea, nausea and vomiting.   Genitourinary: Positive for pelvic pain and vaginal discharge. Negative for decreased urine volume, difficulty urinating, dysuria, flank pain, frequency, hematuria and urgency.   Musculoskeletal: Positive for back pain. Negative for arthralgias, joint swelling, neck pain and neck stiffness.   Skin:        Navel discharge   Allergic/Immunologic: Negative for immunocompromised state.   Neurological: Negative for dizziness, tremors, seizures, syncope, facial asymmetry, speech difficulty, weakness, light-headedness, numbness and headaches.   Hematological: Negative for adenopathy. Does not bruise/bleed easily.   Psychiatric/Behavioral: Negative for confusion and sleep disturbance.       Objective:      Physical Exam   Constitutional: She is oriented to person, place, and time.   Genitourinary:   Genitourinary Comments: Deferred per pt request   Musculoskeletal: Normal range of motion.   Neurological: She is alert and oriented to person, place, and time.   Skin:  Skin is warm and dry.   Navel erythema/mild scaling, and yellowish discharge noted   Psychiatric: She has a normal mood and affect.       Assessment:     Vitals:    02/25/19 1508   BP: 122/72   Pulse: 82   Temp: 96.2 °F (35.7 °C)         1. Navel cellulitis    2. Vaginal discharge    3. UTI symptoms        Plan:   Navel cellulitis  -     doxycycline (MONODOX) 100 MG capsule; Take 1 capsule (100 mg total) by mouth every 12 (twelve) hours.  Dispense: 14 capsule; Refill: 0    Vaginal discharge  -     C. trachomatis/N. gonorrhoeae by AMP DNA [facility]; Urine; Future; Expected date: 02/25/2019  -     doxycycline (MONODOX) 100 MG capsule; Take 1 capsule (100 mg total) by mouth every 12 (twelve) hours.  Dispense: 14 capsule; Refill: 0  -     cefTRIAXone injection 250 mg    UTI symptoms  -     Urinalysis; Future; Expected date: 02/25/2019        As above  tx as tami/chlamydia  Testing as above

## 2019-02-27 ENCOUNTER — LAB VISIT (OUTPATIENT)
Dept: LAB | Facility: HOSPITAL | Age: 24
End: 2019-02-27
Attending: INTERNAL MEDICINE
Payer: COMMERCIAL

## 2019-02-27 ENCOUNTER — OFFICE VISIT (OUTPATIENT)
Dept: INTERNAL MEDICINE | Facility: CLINIC | Age: 24
End: 2019-02-27
Payer: COMMERCIAL

## 2019-02-27 VITALS
TEMPERATURE: 97 F | HEART RATE: 92 BPM | DIASTOLIC BLOOD PRESSURE: 86 MMHG | WEIGHT: 138.88 LBS | BODY MASS INDEX: 21.75 KG/M2 | SYSTOLIC BLOOD PRESSURE: 116 MMHG | OXYGEN SATURATION: 98 %

## 2019-02-27 DIAGNOSIS — N89.8 VAGINAL DISCHARGE: Primary | ICD-10-CM

## 2019-02-27 DIAGNOSIS — N89.8 VAGINAL DISCHARGE: ICD-10-CM

## 2019-02-27 DIAGNOSIS — N30.01 ACUTE CYSTITIS WITH HEMATURIA: ICD-10-CM

## 2019-02-27 LAB
C TRACH DNA SPEC QL NAA+PROBE: NOT DETECTED
N GONORRHOEA DNA SPEC QL NAA+PROBE: NOT DETECTED

## 2019-02-27 PROCEDURE — 86703 HIV-1/HIV-2 1 RESULT ANTBDY: CPT

## 2019-02-27 PROCEDURE — 87340 HEPATITIS B SURFACE AG IA: CPT

## 2019-02-27 PROCEDURE — 86706 HEP B SURFACE ANTIBODY: CPT

## 2019-02-27 PROCEDURE — 99214 PR OFFICE/OUTPT VISIT, EST, LEVL IV, 30-39 MIN: ICD-10-PCS | Mod: S$GLB,,, | Performed by: INTERNAL MEDICINE

## 2019-02-27 PROCEDURE — 99214 OFFICE O/P EST MOD 30 MIN: CPT | Mod: S$GLB,,, | Performed by: INTERNAL MEDICINE

## 2019-02-27 PROCEDURE — 36415 COLL VENOUS BLD VENIPUNCTURE: CPT

## 2019-02-27 PROCEDURE — 86803 HEPATITIS C AB TEST: CPT

## 2019-02-27 PROCEDURE — 86592 SYPHILIS TEST NON-TREP QUAL: CPT

## 2019-02-27 PROCEDURE — 3008F BODY MASS INDEX DOCD: CPT | Mod: CPTII,S$GLB,, | Performed by: INTERNAL MEDICINE

## 2019-02-27 PROCEDURE — 3008F PR BODY MASS INDEX (BMI) DOCUMENTED: ICD-10-PCS | Mod: CPTII,S$GLB,, | Performed by: INTERNAL MEDICINE

## 2019-02-27 PROCEDURE — 99999 PR PBB SHADOW E&M-EST. PATIENT-LVL III: ICD-10-PCS | Mod: PBBFAC,,, | Performed by: INTERNAL MEDICINE

## 2019-02-27 PROCEDURE — 99999 PR PBB SHADOW E&M-EST. PATIENT-LVL III: CPT | Mod: PBBFAC,,, | Performed by: INTERNAL MEDICINE

## 2019-02-27 NOTE — ADDENDUM NOTE
Addended by: SHASTA ANGLIN on: 2/27/2019 11:52 AM     Modules accepted: Orders, Level of Service

## 2019-02-27 NOTE — PROGRESS NOTES
Subjective:      Patient ID: Alea Cooley is a 24 y.o. female.    Chief Complaint: Abdominal Pain      HPI  2 days ago treated for UTI, poss chlamydia/GC with doxy and Clinton IM.  Still with partner, says he denies STDs.  Still with vaginal d/c, getting lighter.  Vulvar rash has now resolved.  Has had HPV series, completed HBV in 1995.    Updated/ annual due 6/19:  HM:  ref fluvax, 8/18 HPV#2, 5/11 TDaP.     Review of Systems   Constitutional: Negative for chills, diaphoresis and fever.   Respiratory: Negative for cough and shortness of breath.    Cardiovascular: Negative for chest pain, palpitations and leg swelling.   Gastrointestinal: Negative for blood in stool, constipation, diarrhea, nausea and vomiting.   Genitourinary: Negative for dysuria, frequency and hematuria.   Psychiatric/Behavioral: The patient is not nervous/anxious.          Objective:   /86 (BP Location: Right arm, Patient Position: Sitting, BP Method: Medium (Manual))   Pulse 92   Temp 97.2 °F (36.2 °C) (Tympanic)   Wt 63 kg (138 lb 14.2 oz)   LMP 02/04/2019 (Approximate)   SpO2 98%   BMI 21.75 kg/m²     Physical Exam   Constitutional: She is oriented to person, place, and time. She appears well-developed.   HENT:   Mouth/Throat: Oropharynx is clear and moist.   Neck: Neck supple. Carotid bruit is not present. No thyroid mass present.   Cardiovascular: Normal rate, regular rhythm and intact distal pulses. Exam reveals no gallop and no friction rub.   No murmur heard.  Pulmonary/Chest: Effort normal and breath sounds normal. She has no wheezes. She has no rales.   Abdominal: Soft. Bowel sounds are normal. She exhibits no mass. There is no hepatosplenomegaly. There is no tenderness.   Musculoskeletal: She exhibits no edema.   Lymphadenopathy:     She has no cervical adenopathy.   Neurological: She is alert and oriented to person, place, and time.   Psychiatric: She has a normal mood and affect.           Assessment:       1. Vaginal  discharge    2. Acute cystitis with hematuria          Plan:     Vaginal discharge, Acute cystitis with hematuria- s/p Clinton/doxy x 3d.  KS results.  -     Urinalysis; Future; Expected date: 02/27/2019  -     Urine culture; Future; Expected date: 02/27/2019  -     RPR; Future; Expected date: 02/27/2019  -     HIV 1/2 Ag/Ab (4th Gen); Future; Expected date: 02/27/2019  -     Hepatitis B surface antibody; Future; Expected date: 02/27/2019  -     Hepatitis B surface antigen; Future; Expected date: 02/27/2019

## 2019-02-28 ENCOUNTER — PATIENT MESSAGE (OUTPATIENT)
Dept: INTERNAL MEDICINE | Facility: CLINIC | Age: 24
End: 2019-02-28

## 2019-02-28 DIAGNOSIS — R76.8 HCV ANTIBODY POSITIVE: Primary | ICD-10-CM

## 2019-02-28 LAB
HBV SURFACE AB SER-ACNC: POSITIVE M[IU]/ML
HBV SURFACE AG SERPL QL IA: NEGATIVE
HCV AB SERPL QL IA: NEGATIVE
HIV 1+2 AB+HIV1 P24 AG SERPL QL IA: NEGATIVE
RPR SER QL: NORMAL

## 2019-02-28 NOTE — TELEPHONE ENCOUNTER
Please call lab and see if lab order can be added to yesterday's blood draw, or see if it is automatically done?  SM

## 2019-02-28 NOTE — TELEPHONE ENCOUNTER
I was incorrect.  Spoke with pt- protected against HBV, HCV Ab is negative.  Please cancel PCR lab.  SM

## 2019-03-08 ENCOUNTER — OFFICE VISIT (OUTPATIENT)
Dept: PSYCHIATRY | Facility: CLINIC | Age: 24
End: 2019-03-08
Payer: COMMERCIAL

## 2019-03-08 DIAGNOSIS — F41.0 PANIC DISORDER WITHOUT AGORAPHOBIA: Primary | ICD-10-CM

## 2019-03-08 PROCEDURE — 90834 PR PSYCHOTHERAPY W/PATIENT, 45 MIN: ICD-10-PCS | Mod: S$GLB,,, | Performed by: SOCIAL WORKER

## 2019-03-08 PROCEDURE — 90834 PSYTX W PT 45 MINUTES: CPT | Mod: S$GLB,,, | Performed by: SOCIAL WORKER

## 2019-03-12 NOTE — PROGRESS NOTES
Individual Psychotherapy (PhD/LCSW)    3/8/2019    [facility]         Therapeutic Intervention: Met with patient.  Outpatient - Insight oriented psychotherapy 45 min - CPT code 18702    Chief complaint/reason for encounter: anxiety     Interval history and content of current session:  Patient presents to ongoing individual therapy due to anxiety.  She denies any recent episodes of anxiety.  She is not sure what is going to happen at her job when her supervisor moves.  She wonders if she will have to do more work.  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  She is not sure what her long term occupational goal is.  She continues to date her boyfriend in [place].  She picked him up in the [place] from his job making candy.  He lives in the [place] with his aunt.  He did not want to go to parades due to the large crowds.  They enjoyed some quiet time at home.  She continues to enjoy her apartment and her dog.  Since repairs have been done on the apartment next door, she has not had any more water in her apartment.  She will see her parents about once a week.  She is encouraged to increase her physical activity.  She is praised for positive steps to gain independence by getting her own apartment.  She does feel that the decreased anxiety is due to avoiding toxic relationships like she has had in the past.    Treatment plan:  Target symptoms: anxiety   Why chosen therapy is appropriate versus another modality: relevant to diagnosis  Outcome monitoring methods: self-report, observation  Therapeutic intervention type: insight oriented psychotherapy, supportive psychotherapy, interactive psychotherapy     Risk parameters:  Patient reports no suicidal ideation  Patient reports no homicidal ideation  Patient reports no self-injurious behavior  Patient reports no violent behavior     Verbal deficits: None     Patient's response to intervention:  The patient's  response to intervention is accepting, motivated.     Progress toward goals and other mental status changes:  The patient's progress toward goals is fair .     Diagnosis:   Panic Disorder     Plan:  individual psychotherapy and medication management by physician     Return to clinic: as scheduled     Length of Service (minutes): 45

## 2019-04-18 RX ORDER — BUPROPION HYDROCHLORIDE 150 MG/1
150 TABLET ORAL DAILY
Qty: 30 TABLET | Refills: 1 | Status: SHIPPED | OUTPATIENT
Start: 2019-04-18 | End: 2019-05-28 | Stop reason: SDUPTHER

## 2019-04-18 RX ORDER — CLONAZEPAM 0.5 MG/1
0.5 TABLET ORAL NIGHTLY
Qty: 30 TABLET | Refills: 1 | Status: SHIPPED | OUTPATIENT
Start: 2019-04-18 | End: 2019-05-28 | Stop reason: SDUPTHER

## 2019-05-28 ENCOUNTER — OFFICE VISIT (OUTPATIENT)
Dept: PSYCHIATRY | Facility: CLINIC | Age: 24
End: 2019-05-28
Payer: COMMERCIAL

## 2019-05-28 VITALS
HEART RATE: 70 BPM | SYSTOLIC BLOOD PRESSURE: 109 MMHG | WEIGHT: 149.25 LBS | BODY MASS INDEX: 23.38 KG/M2 | DIASTOLIC BLOOD PRESSURE: 66 MMHG

## 2019-05-28 DIAGNOSIS — F32.A ANXIETY AND DEPRESSION: ICD-10-CM

## 2019-05-28 DIAGNOSIS — F41.0 PANIC DISORDER WITHOUT AGORAPHOBIA: Primary | ICD-10-CM

## 2019-05-28 DIAGNOSIS — F41.9 ANXIETY AND DEPRESSION: ICD-10-CM

## 2019-05-28 PROCEDURE — 90834 PSYTX W PT 45 MINUTES: CPT | Mod: S$GLB,,, | Performed by: SOCIAL WORKER

## 2019-05-28 PROCEDURE — 3008F PR BODY MASS INDEX (BMI) DOCUMENTED: ICD-10-PCS | Mod: CPTII,S$GLB,, | Performed by: PSYCHIATRY & NEUROLOGY

## 2019-05-28 PROCEDURE — 99999 PR PBB SHADOW E&M-EST. PATIENT-LVL II: ICD-10-PCS | Mod: PBBFAC,,, | Performed by: PSYCHIATRY & NEUROLOGY

## 2019-05-28 PROCEDURE — 90834 PR PSYCHOTHERAPY W/PATIENT, 45 MIN: ICD-10-PCS | Mod: S$GLB,,, | Performed by: SOCIAL WORKER

## 2019-05-28 PROCEDURE — 99999 PR PBB SHADOW E&M-EST. PATIENT-LVL II: CPT | Mod: PBBFAC,,, | Performed by: PSYCHIATRY & NEUROLOGY

## 2019-05-28 PROCEDURE — 3008F BODY MASS INDEX DOCD: CPT | Mod: CPTII,S$GLB,, | Performed by: PSYCHIATRY & NEUROLOGY

## 2019-05-28 PROCEDURE — 99214 OFFICE O/P EST MOD 30 MIN: CPT | Mod: S$GLB,,, | Performed by: PSYCHIATRY & NEUROLOGY

## 2019-05-28 PROCEDURE — 99214 PR OFFICE/OUTPT VISIT, EST, LEVL IV, 30-39 MIN: ICD-10-PCS | Mod: S$GLB,,, | Performed by: PSYCHIATRY & NEUROLOGY

## 2019-05-28 RX ORDER — BUPROPION HYDROCHLORIDE 150 MG/1
150 TABLET ORAL DAILY
Qty: 30 TABLET | Refills: 1 | Status: SHIPPED | OUTPATIENT
Start: 2019-05-28 | End: 2019-09-05 | Stop reason: SDUPTHER

## 2019-05-28 RX ORDER — CLONAZEPAM 0.5 MG/1
0.5 TABLET ORAL NIGHTLY
Qty: 30 TABLET | Refills: 2 | Status: SHIPPED | OUTPATIENT
Start: 2019-05-28 | End: 2019-09-05 | Stop reason: SDUPTHER

## 2019-05-28 RX ORDER — FLUOXETINE HYDROCHLORIDE 20 MG/1
CAPSULE ORAL
Qty: 180 CAPSULE | Refills: 0 | Status: SHIPPED | OUTPATIENT
Start: 2019-05-28 | End: 2019-09-05 | Stop reason: DRUGHIGH

## 2019-05-28 NOTE — PROGRESS NOTES
"Outpatient Psychiatry Follow-up Visit (MD/NP)    5/28/2019    Alea Cooley, a 24 y.o. female, presenting for follow-up visit. Met with patient.    Reason for Encounter: Patient complains of depression, anxiety.    Interval History: Patient seen and interviewed for follow-up, last seen about 4 months ago. Saw Milan Mejia in March. Mixed results in school, got an A in English, had to drop 2 other classes, may not qualify for [employer] komal now. WIll have to talk to financial aid staff. Moods most problematic prior to her period. Had gotten better then back over time. Energy has been low. Trying to force self to be more active. "don't know if it's worked". Eating more. No new stress. BF moved in. Working out well. Family life going ok. Work has been monotonous/unmotivated, but took a week vacation to vacation and that helped. Therapy has been helpful. Had to cancel most recent time. Taking mental health meds. No side effects. Has brief discontinuation between refills.     Background: 22 y/o F presents for establishment of care, reports intermittent treatment with medication since age 15. Describes episodic spells featuring overworry, simple things "get my brain stuck or fixated". Getting worked up over simple things. All of a sudden get really hot and hard to breathe, start crying. Problems functioning in personal and occupational roles - having difficulty completing work tasks due to problems with concentration (e.g. Losing track of counting at work). Takes meds but believes they're not working well. Says when not on meds has been unable to hold a job. "can't handle it". If forgets to take meds may have spell every 2-3 days. On meds consistently, spells happen every few weeks. After awhile felt concentration was getting worse even on meds and even though anxiety was mostly controlled. Added wellbutrin. Seems to have helped. Takes meds regularly. No side effects. Takes clonazepam - Started taking it once a day then more " "recently keeping them in reserve as a rescue med.     Psych Hx: from 15 to 18, in & out of psych hospital. About 6-7 times. First hospitalization in which she was paranoid, "thinking wasn't quite right". "dreamlike state of mind". First episode - synthetic MJ first time smoking it that day - "acting strange" [place]. "asked a reji for his keys to get home". "had this idea that I was dreaming and I couldn't wake up". Hospitalized at [facility] for about a week. "felt like my brain never was the same after that". Was prescribed antipsychotic, stopped it "because I didn't like the way it may me feel".     Concentration was a problem, "would feel lost in my own mind". Was hard to come to terms with how my brain changed. "got depressed a lot", "felt frustrated and helpless". Extremely unmotivated, self-critical for not being able to do things she thought she was able to do. "Didn't want to exist anymore". Had suicidal fantasies, "but I didn't think that I ever could". Has self-harmed by scratching/cutting self "because I was angry at myself". Would get kind of hysterical where I couldn't deal with my own emotions, crying and not able to talk to people without being overdramatic, mom encouraged hospitalization. "Also in kind of a delusional state", would get paranoid. Hadn't been using any drugs. [facility] Stayed about 1 week. Thinks doctors called it "depression with anxiety". Meds started, perhaps wellbutrin + abilify. Felt like it helped but not fully.     "made a lot of progress" with Dr. Centeno at [facility]. Depression improved with prozac. Dr. Centeno, but didn't have rapport with the new person. Was no longer eligible for medicaid. Insurance/access of care led her to be off medication. Had more symptoms off medicine, couldn't keep a job. Later was able to get a decent job and continue it. Parents eventually got her on their insurance and she's been seeing " "Dr. Ramirez since. "haven't gotten paranoid in a long time", not since age 18 or 19.     Describes pattern of increased emotionality (some , decreased sleep (sometimes decreased need, sometimes "being too afraid to go to sleep"), and then hallucinations when sleep deprived. Laughing for no reasons, for weird reasons. "for awhile they were calling it 'bipolar schizoaffective' (same diagnosis my grandmother had). Dr. Centeno tried other things. Last hospitalization was about 2 years ago.     7th, 8th grade - first time getting more paranoid. "thought people at school were talking about me and saying bad things about me".      MedHx: denies other health problems.   FamHx: grandmother - schizoaffective disorder (featuring paranoia, anxiety, AVH; multiple hospitalizations). Paternal great-grandfather "shell-shocked from the war")mom heard voices, had serious anxiety in 20's and 30's. "coped with it through reciting scriptures and saying comforting things to herself", "went away.     SocHx: from [place]. Grew up with both parents. Only child. No serious maltreatment. No developmental problems. Had problems with moods in context of being heavily criticized by teacher. "gained a lot of weight". Bullied. "used food to cope with things". Had to repeat 4th grade due to emotional problems, school avoidance. Then did home school next year but then failed leap test and mom realized she needed to go back to school. Started to lose weight in 5th grade. Obsessing about losing weight in middle school, around time started to have some paranoid thoughts. Was somewhat socially withdrawn, "not want to go anywhere". Always shy but "it went to another level". Didn't want to talk. "wanted to be invisible". Always thought I wasn't normal. "thought if I change the way I looked I could be normal". Started eating more again when running track, gained about 50 pounds. Was glad to notice people still wanted to be her friend. Mind wasn't same after " "psychotic episode with synthetic MJ - "sense of time was distorted", "felt like my mind wasn't grounded" (has gotten better over time). Was an "A/B student" before episode then some D's/F's after episode. "hard time understanding and concentrating" afterwards. Was pulled out of school and mom home-schooled her in 10th grade. Didn't have money to continue it in 11th grade, but started late so was going to have to repeat the grade. Got GED.     Lives with mom and dad. Good relationships, supportive. 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 Likes the job. Likes structured environments. Full-time work.     Review Of Systems:     GENERAL:  No weight gain or loss  SKIN:  No rashes or lacerations  HEAD:  No headaches  EYES:  No exophthalmos, jaundice or blindness  EARS:  No dizziness, tinnitus or hearing loss  NOSE:  No changes in smell  MOUTH & THROAT:  No dyskinetic movements or obvious goiter  CHEST:  No shortness of breath, hyperventilation or cough  CARDIOVASCULAR:  No tachycardia or chest pain  ABDOMEN:  No nausea, vomiting, pain, constipation or diarrhea  URINARY:  No frequency, dysuria or sexual dysfunction  ENDOCRINE:  No polydipsia, polyuria  MUSCULOSKELETAL:  No pain or stiffness of the joints  NEUROLOGIC:  No weakness, sensory changes, seizures, confusion, memory loss, tremor or other abnormal movements    Current Evaluation:     Nutritional Screening: Considering the patient's height and weight, medications, medical history and preferences, should a referral be made to the dietitian? no    Constitutional  Vitals:  Most recent vital signs, dated less than 90 days prior to this appointment, were not reviewed.    Vitals:    05/28/19 1531   BP: 109/66   Pulse: 70   Weight: 67.7 kg (149 lb 4 oz)        General:  unremarkable, age appropriate     Musculoskeletal  Muscle Strength/Tone:  no tremor, no tic   Gait & Station:  non-ataxic     Psychiatric  Appearance: casually dressed & groomed;   Behavior: calm, somewhat odd " manner  Cooperation: cooperative with assessment  Speech: normal rate, volume, tone  Thought Process: linear, goal-directed  Thought Content: No suicidal or homicidal ideation; no delusions  Affect: anxious  Mood: anxious  Perceptions: No auditory or visual hallucinations  Level of Consciousness: alert throughout interview  Insight: fair  Cognition: Oriented to person, place, time, & situation  Memory: no apparent deficits to general clinical interview; not formally assessed  Attention/Concentration: no apparent deficits to general clinical interview; not formally assessed  Fund of Knowledge: average by vocabulary/education    Laboratory Data  No visits with results within 1 Month(s) from this visit.   Latest known visit with results is:   Lab Visit on 02/27/2019   Component Date Value Ref Range Status    RPR 02/27/2019 Non-reactive  Non-reactive Final    HIV 1/2 Ag/Ab 02/27/2019 Negative  Negative Final    Hep B S Ab 02/27/2019 Positive*  Final    Hepatitis B Surface Ag 02/27/2019 Negative   Final    Hepatitis C Ab 02/27/2019 Negative   Final     Medications  Outpatient Encounter Medications as of 5/28/2019   Medication Sig Dispense Refill    buPROPion (WELLBUTRIN XL) 150 MG TB24 tablet Take 1 tablet (150 mg total) by mouth once daily. 30 tablet 1    clonazePAM (KLONOPIN) 0.5 MG tablet Take 1 tablet (0.5 mg total) by mouth every evening. 30 tablet 1    doxycycline (MONODOX) 100 MG capsule Take 1 capsule (100 mg total) by mouth every 12 (twelve) hours. 14 capsule 0    FLUoxetine (PROZAC) 20 MG capsule Take 1 capsule (20 mg total) by mouth once daily. 90 capsule 1     No facility-administered encounter medications on file as of 5/28/2019.      Assessment - Diagnosis - Goals:     Impression: 25 y/o F with chronic anxiety which is fairly pervasive, as well as episodic periods with more severe symptoms, depressive episodes, and cognitive symptoms, particularly following k2 use. Psychosis less prominent in  recent years. Anxiety reduced with current treatment. Has premenstrual pattern of dysphoria    Dx: anxiety disorder, consider bipolar disorder.     Treatment Goals:  Specify outcomes written in observable, behavioral terms: reduce anxiety.     Treatment Plan/Recommendations:   · Fluoxetine (increase dose to 40 mg daily during 14 days prior to anticipated period, 20 mg daily during rest of the cycle), bupropion. Clonazepam prn.  · Continue psychotherapy.   · Discussed risks, benefits, and alternatives to treatment plan documented above with patient. I answered all patient questions related to this plan and patient expressed understanding and agreement.     Return to Clinic: 2 months    Counseling time: 10 minutes  Total time: 25 minutes    KENNEDY Campos MD  Psychiatry  [facility]  [address] , [place], [place] [id number] [phone]

## 2019-05-31 NOTE — PROGRESS NOTES
"Individual Psychotherapy (PhD/LCSW)    5/28/2019    [facility]         Therapeutic Intervention: Met with patient.  Outpatient - Insight oriented psychotherapy 45 min - CPT code 19798    Chief complaint/reason for encounter: anxiety     Interval history and content of current session:  Patient presents to ongoing individual therapy due to anxiety.  She has been experiencing increased anxiety around her cycle.  She admits it will only last a couple of days.  She decided to allow her boyfriend to move in with her.  She feels it was difficult to maintain the relationship since he was living in [place].  He was able to find a job at [employer] as a  through a friend.  He may be able to get a better job at [employer] through another friend.  She continues to work at [employer].  She admits that she had a to be "counseled" because she was not working as fast due to being burned out.  However, she was able to take some time off and her productivity has improved.  She admits that her job has good pay and good benefits.  She continues to go to school at [employer].  She admits that her mother is not happy that the patient's boyfriend is living with her because of her mother's Religious values.  She has been worried about the  being called to her apartment recently.  She and her boyfriend are considering moving.  He has been helping her out with the bills of the apartment.  She is encouraged be careful that her boyfriend does not take financial advantage of her.      Target symptoms: anxiety   Why chosen therapy is appropriate versus another modality: relevant to diagnosis  Outcome monitoring methods: self-report, observation  Therapeutic intervention type: insight oriented psychotherapy, supportive psychotherapy, interactive psychotherapy     Risk parameters:  Patient reports no suicidal ideation  Patient reports no homicidal ideation  Patient reports no self-injurious behavior  Patient reports " no violent behavior     Verbal deficits: None     Patient's response to intervention:  The patient's response to intervention is accepting, motivated.     Progress toward goals and other mental status changes:  The patient's progress toward goals is fair .     Diagnosis:   Panic Disorder     Plan:  individual psychotherapy and medication management by physician     Return to clinic: as scheduled     Length of Service (minutes): 45

## 2019-08-27 ENCOUNTER — TELEPHONE (OUTPATIENT)
Dept: PSYCHIATRY | Facility: CLINIC | Age: 24
End: 2019-08-27

## 2019-09-04 NOTE — PROGRESS NOTES
"Outpatient Psychiatry Follow-up Visit (MD/NP)    9/5/2019    Alea Cooley, a 24 y.o. female, presenting for follow-up visit. Met with patient.    Reason for Encounter: Patient complains of depression, anxiety.    Interval History: Patient seen and interviewed for follow-up, last seen about 3 months ago. Reports moods modestly improved. Functioning ok in occupational and personal roles. No new health problems or meds. Adherent to medication, believes it's helping. Has been taking higher prozac dose throughout the month and finds this helpful, denies side effects. No therapy visits since last visit in May.     Background: 24 y/o F presents for establishment of care, reports intermittent treatment with medication since age 15. Describes episodic spells featuring overworry, simple things "get my brain stuck or fixated". Getting worked up over simple things. All of a sudden get really hot and hard to breathe, start crying. Problems functioning in personal and occupational roles - having difficulty completing work tasks due to problems with concentration (e.g. Losing track of counting at work). Takes meds but believes they're not working well. Says when not on meds has been unable to hold a job. "can't handle it". If forgets to take meds may have spell every 2-3 days. On meds consistently, spells happen every few weeks. After awhile felt concentration was getting worse even on meds and even though anxiety was mostly controlled. Added wellbutrin. Seems to have helped. Takes meds regularly. No side effects. Takes clonazepam - Started taking it once a day then more recently keeping them in reserve as a rescue med.     Psych Hx: from 15 to 18, in & out of psych hospital. About 6-7 times. First hospitalization in which she was paranoid, "thinking wasn't quite right". "dreamlike state of mind". First episode - synthetic MJ first time smoking it that day - "acting strange" and [place]. "asked a reji for his " "keys to get home". "had this idea that I was dreaming and I couldn't wake up". Hospitalized at [place] [facility] for about a week. "felt like my brain never was the same after that". Was prescribed antipsychotic, stopped it "because I didn't like the way it may me feel".     Concentration was a problem, "would feel lost in my own mind". Was hard to come to terms with how my brain changed. "got depressed a lot", "felt frustrated and helpless". Extremely unmotivated, self-critical for not being able to do things she thought she was able to do. "Didn't want to exist anymore". Had suicidal fantasies, "but I didn't think that I ever could". Has self-harmed by scratching/cutting self "because I was angry at myself". Would get kind of hysterical where I couldn't deal with my own emotions, crying and not able to talk to people without being overdramatic, mom encouraged hospitalization. "Also in kind of a delusional state", would get paranoid. Hadn't been using any drugs. [facility]nd hospitalization at [facility]. Stayed about 1 week. Thinks doctors called it "depression with anxiety". Meds started, perhaps wellbutrin + abilify. Felt like it helped but not fully.     "made a lot of progress" with Dr. Centeno at [facility]. Depression improved with prozac. Dr. Centeno, but didn't have rapport with the new person. Was no longer eligible for medicaid. Insurance/access of care led her to be off medication. Had more symptoms off medicine, couldn't keep a job. Later was able to get a decent job and continue it. Parents eventually got her on their insurance and she's been seeing Dr. Ramirez since. "haven't gotten paranoid in a long time", not since age 18 or 19.     Describes pattern of increased emotionality (some , decreased sleep (sometimes decreased need, sometimes "being too afraid to go to sleep"), and then hallucinations when sleep deprived. Laughing for no reasons, for weird reasons. "for awhile they were calling it 'bipolar " "schizoaffective' (same diagnosis my grandmother had). Dr. Centeno tried other things. Last hospitalization was about 2 years ago.     7th, 8th grade - first time getting more paranoid. "thought people at school were talking about me and saying bad things about me".      MedHx: denies other health problems.   FamHx: grandmother - schizoaffective disorder (featuring paranoia, anxiety, AVH; multiple hospitalizations). Paternal great-grandfather "shell-shocked from the war")mom heard voices, had serious anxiety in 20's and 30's. "coped with it through reciting scriptures and saying comforting things to herself", "went away.     SocHx: from [place]. Grew up with both parents. Only child. No serious maltreatment. No developmental problems. Had problems with moods in context of being heavily criticized by teacher. "gained a lot of weight". Bullied. "used food to cope with things". Had to repeat 4th grade due to emotional problems, school avoidance. Then did home school next year but then failed leap test and mom realized she needed to go back to school. Started to lose weight in 5th grade. Obsessing about losing weight in middle school, around time started to have some paranoid thoughts. Was somewhat socially withdrawn, "not want to go anywhere". Always shy but "it went to another level". Didn't want to talk. "wanted to be invisible". Always thought I wasn't normal. "thought if I change the way I looked I could be normal". Started eating more again when running track, gained about 50 pounds. Was glad to notice people still wanted to be her friend. Mind wasn't same after psychotic episode with synthetic MJ - "sense of time was distorted", "felt like my mind wasn't grounded" (has gotten better over time). Was an "A/B student" before episode then some D's/F's after episode. "hard time understanding and concentrating" afterwards. Was pulled out of school and mom home-schooled her in 10th grade. Didn't have money to continue it in " 11th grade, but started late so was going to have to repeat the grade. Got GED.     Lives with mom and dad. Good relationships, supportive. 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 Likes structured environments. Full-time work.     Review Of Systems:     GENERAL:  No weight gain or loss  SKIN:  No rashes or lacerations  HEAD:  No headaches  EYES:  No exophthalmos, jaundice or blindness  EARS:  No dizziness, tinnitus or hearing loss  NOSE:  No changes in smell  MOUTH & THROAT:  No dyskinetic movements or obvious goiter  CHEST:  No shortness of breath, hyperventilation or cough  CARDIOVASCULAR:  No tachycardia or chest pain  ABDOMEN:  No nausea, vomiting, pain, constipation or diarrhea  URINARY:  No frequency, dysuria or sexual dysfunction  ENDOCRINE:  No polydipsia, polyuria  MUSCULOSKELETAL:  No pain or stiffness of the joints  NEUROLOGIC:  No weakness, sensory changes, seizures, confusion, memory loss, tremor or other abnormal movements    Current Evaluation:     Nutritional Screening: Considering the patient's height and weight, medications, medical history and preferences, should a referral be made to the dietitian? no    Constitutional  Vitals:  Most recent vital signs, dated less than 90 days prior to this appointment, were not reviewed.    There were no vitals filed for this visit.     General:  unremarkable, age appropriate     Musculoskeletal  Muscle Strength/Tone:  no tremor, no tic   Gait & Station:  non-ataxic     Psychiatric  Appearance: casually dressed & groomed;   Behavior: calm, somewhat odd manner  Cooperation: cooperative with assessment  Speech: normal rate, volume, tone  Thought Process: linear, goal-directed  Thought Content: No suicidal or homicidal ideation; no delusions  Affect: anxious  Mood: anxious  Perceptions: No auditory or visual hallucinations  Level of Consciousness: alert throughout interview  Insight: fair  Cognition: Oriented to person, place, time, &  situation  Memory: no apparent deficits to general clinical interview; not formally assessed  Attention/Concentration: no apparent deficits to general clinical interview; not formally assessed  Fund of Knowledge: average by vocabulary/education    Laboratory Data  No visits with results within 1 Month(s) from this visit.   Latest known visit with results is:   Lab Visit on 02/27/2019   Component Date Value Ref Range Status    RPR 02/27/2019 Non-reactive  Non-reactive Final    HIV 1/2 Ag/Ab 02/27/2019 Negative  Negative Final    Hep B S Ab 02/27/2019 Positive*  Final    Hepatitis B Surface Ag 02/27/2019 Negative   Final    Hepatitis C Ab 02/27/2019 Negative   Final     Medications  Outpatient Encounter Medications as of 9/5/2019   Medication Sig Dispense Refill    buPROPion (WELLBUTRIN XL) 150 MG TB24 tablet Take 1 tablet (150 mg total) by mouth once daily. 30 tablet 1    clonazePAM (KLONOPIN) 0.5 MG tablet Take 1 tablet (0.5 mg total) by mouth every evening. 30 tablet 2    doxycycline (MONODOX) 100 MG capsule Take 1 capsule (100 mg total) by mouth every 12 (twelve) hours. 14 capsule 0    FLUoxetine 20 MG capsule Take 2 capsules daily 180 capsule 0     No facility-administered encounter medications on file as of 9/5/2019.      Assessment - Diagnosis - Goals:     Impression: 25 y/o F with chronic anxiety which is fairly pervasive, as well as episodic periods with more severe symptoms, depressive episodes, and cognitive symptoms, particularly following k2 use. Psychosis less prominent in recent years. Anxiety reduced with current treatment. Has premenstrual pattern of dysphoria.     Dx: anxiety disorder, consider bipolar disorder.     Treatment Goals:  Specify outcomes written in observable, behavioral terms: reduce anxiety.     Treatment Plan/Recommendations:   · Fluoxetine 40 mg daily, bupropion 150. Clonazepam prn.  · Continue psychotherapy.   · Discussed risks, benefits, and alternatives to treatment plan  documented above with patient. I answered all patient questions related to this plan and patient expressed understanding and agreement.     Return to Clinic: 2 months    Counseling time: 10 minutes  Total time: 25 minutes    KENNEDY Campos MD  Psychiatry  [facility]  [address] , [place] [address]  [phone]

## 2019-09-05 ENCOUNTER — OFFICE VISIT (OUTPATIENT)
Dept: PSYCHIATRY | Facility: CLINIC | Age: 24
End: 2019-09-05
Payer: COMMERCIAL

## 2019-09-05 VITALS
DIASTOLIC BLOOD PRESSURE: 70 MMHG | SYSTOLIC BLOOD PRESSURE: 108 MMHG | WEIGHT: 147.69 LBS | BODY MASS INDEX: 23.13 KG/M2 | HEART RATE: 61 BPM

## 2019-09-05 DIAGNOSIS — F41.9 ANXIETY AND DEPRESSION: Primary | ICD-10-CM

## 2019-09-05 DIAGNOSIS — F32.A ANXIETY AND DEPRESSION: Primary | ICD-10-CM

## 2019-09-05 PROCEDURE — 3008F PR BODY MASS INDEX (BMI) DOCUMENTED: ICD-10-PCS | Mod: CPTII,S$GLB,, | Performed by: PSYCHIATRY & NEUROLOGY

## 2019-09-05 PROCEDURE — 99214 PR OFFICE/OUTPT VISIT, EST, LEVL IV, 30-39 MIN: ICD-10-PCS | Mod: S$GLB,,, | Performed by: PSYCHIATRY & NEUROLOGY

## 2019-09-05 PROCEDURE — 99999 PR PBB SHADOW E&M-EST. PATIENT-LVL II: ICD-10-PCS | Mod: PBBFAC,,, | Performed by: PSYCHIATRY & NEUROLOGY

## 2019-09-05 PROCEDURE — 99214 OFFICE O/P EST MOD 30 MIN: CPT | Mod: S$GLB,,, | Performed by: PSYCHIATRY & NEUROLOGY

## 2019-09-05 PROCEDURE — 3008F BODY MASS INDEX DOCD: CPT | Mod: CPTII,S$GLB,, | Performed by: PSYCHIATRY & NEUROLOGY

## 2019-09-05 PROCEDURE — 99999 PR PBB SHADOW E&M-EST. PATIENT-LVL II: CPT | Mod: PBBFAC,,, | Performed by: PSYCHIATRY & NEUROLOGY

## 2019-09-05 RX ORDER — FLUOXETINE HYDROCHLORIDE 40 MG/1
40 CAPSULE ORAL DAILY
Qty: 30 CAPSULE | Refills: 3 | Status: SHIPPED | OUTPATIENT
Start: 2019-09-05 | End: 2020-03-02 | Stop reason: SDUPTHER

## 2019-09-05 RX ORDER — CLONAZEPAM 0.5 MG/1
0.5 TABLET ORAL NIGHTLY
Qty: 30 TABLET | Refills: 3 | Status: SHIPPED | OUTPATIENT
Start: 2019-09-05 | End: 2020-03-02 | Stop reason: SDUPTHER

## 2019-09-05 RX ORDER — BUPROPION HYDROCHLORIDE 150 MG/1
150 TABLET ORAL DAILY
Qty: 30 TABLET | Refills: 3 | Status: SHIPPED | OUTPATIENT
Start: 2019-09-05 | End: 2020-03-02

## 2019-10-03 ENCOUNTER — OFFICE VISIT (OUTPATIENT)
Dept: PSYCHIATRY | Facility: CLINIC | Age: 24
End: 2019-10-03
Payer: COMMERCIAL

## 2019-10-03 DIAGNOSIS — F41.0 PANIC DISORDER WITHOUT AGORAPHOBIA: Primary | ICD-10-CM

## 2019-10-03 PROCEDURE — 90834 PR PSYCHOTHERAPY W/PATIENT, 45 MIN: ICD-10-PCS | Mod: S$GLB,,, | Performed by: SOCIAL WORKER

## 2019-10-03 PROCEDURE — 90834 PSYTX W PT 45 MINUTES: CPT | Mod: S$GLB,,, | Performed by: SOCIAL WORKER

## 2019-10-30 ENCOUNTER — PATIENT MESSAGE (OUTPATIENT)
Dept: INTERNAL MEDICINE | Facility: CLINIC | Age: 24
End: 2019-10-30

## 2019-12-17 ENCOUNTER — PATIENT MESSAGE (OUTPATIENT)
Dept: PSYCHIATRY | Facility: CLINIC | Age: 24
End: 2019-12-17

## 2020-03-02 ENCOUNTER — OFFICE VISIT (OUTPATIENT)
Dept: PSYCHIATRY | Facility: CLINIC | Age: 25
End: 2020-03-02

## 2020-03-02 VITALS
BODY MASS INDEX: 24.76 KG/M2 | SYSTOLIC BLOOD PRESSURE: 130 MMHG | WEIGHT: 158.06 LBS | DIASTOLIC BLOOD PRESSURE: 92 MMHG | HEART RATE: 82 BPM

## 2020-03-02 DIAGNOSIS — F31.30 BIPOLAR AFFECTIVE DISORDER, CURRENT EPISODE DEPRESSED, CURRENT EPISODE SEVERITY UNSPECIFIED: Primary | ICD-10-CM

## 2020-03-02 PROCEDURE — 99999 PR PBB SHADOW E&M-EST. PATIENT-LVL II: CPT | Mod: PBBFAC,,, | Performed by: PSYCHIATRY & NEUROLOGY

## 2020-03-02 PROCEDURE — 99212 OFFICE O/P EST SF 10 MIN: CPT | Mod: PBBFAC | Performed by: PSYCHIATRY & NEUROLOGY

## 2020-03-02 PROCEDURE — 99214 PR OFFICE/OUTPT VISIT, EST, LEVL IV, 30-39 MIN: ICD-10-PCS | Mod: S$GLB,,, | Performed by: PSYCHIATRY & NEUROLOGY

## 2020-03-02 PROCEDURE — 3008F PR BODY MASS INDEX (BMI) DOCUMENTED: ICD-10-PCS | Mod: CPTII,S$GLB,, | Performed by: PSYCHIATRY & NEUROLOGY

## 2020-03-02 PROCEDURE — 99999 PR PBB SHADOW E&M-EST. PATIENT-LVL II: ICD-10-PCS | Mod: PBBFAC,,, | Performed by: PSYCHIATRY & NEUROLOGY

## 2020-03-02 PROCEDURE — 99214 OFFICE O/P EST MOD 30 MIN: CPT | Mod: S$GLB,,, | Performed by: PSYCHIATRY & NEUROLOGY

## 2020-03-02 PROCEDURE — 3008F BODY MASS INDEX DOCD: CPT | Mod: CPTII,S$GLB,, | Performed by: PSYCHIATRY & NEUROLOGY

## 2020-03-02 RX ORDER — FLUOXETINE HYDROCHLORIDE 40 MG/1
40 CAPSULE ORAL DAILY
Qty: 30 CAPSULE | Refills: 2 | Status: SHIPPED | OUTPATIENT
Start: 2020-03-02 | End: 2020-12-28

## 2020-03-02 RX ORDER — ARIPIPRAZOLE 15 MG/1
15 TABLET ORAL DAILY
Qty: 30 TABLET | Refills: 2 | Status: SHIPPED | OUTPATIENT
Start: 2020-03-02 | End: 2020-12-28

## 2020-03-02 RX ORDER — CLONAZEPAM 0.5 MG/1
0.5 TABLET ORAL NIGHTLY
Qty: 30 TABLET | Refills: 2 | Status: SHIPPED | OUTPATIENT
Start: 2020-03-02 | End: 2020-12-28

## 2020-03-02 NOTE — PROGRESS NOTES
"Outpatient Psychiatry Follow-up Visit (MD/NP)    3/2/2020    Alea Cooley, a 25 y.o. female, presenting for follow-up visit. Met with patient.    Reason for Encounter: Patient complains of depression, anxiety.    Interval History: Patient seen and interviewed for follow-up, last seen about 6 months ago. Endorses dysphoria, feelings of paranoia, panic attacks;- came after use of a single use of LSD. Says she had been stable until just before her use, but says she's continued to have considerable ongoing symptoms despite mild improvement & medication change since then.  Admitted to [facility] unit on 1.2, inpatient for a few days. 1 medication added and "thought I was better" as inpatient. Tolerated med switch ok, but began to have paranoia again after discharge ("thought my coworkers were planning something against me"). Eventually quit job due to ongoing paranoia. Started new job at [employer], quit it too because of paranoia, eventually quit another job due to being taken advantage of by coworkers/employer. Paranoia has slightly improved over time. Continuing to have problems with anxiety and depression. Health is otherwise ok. No new medications. Struggling to keep herself together. More depressed. Living between [place] in . Adherent to medication.       From VANNA note - 1.2.20 -   ED  1/2/2020   [facility] - Emergency Department   [facility]sidiaries and Affiliates  Location   Jump to Section ?     Alea Cooley - 25 y.o. Female; born [birth year][facility], generated on Mar. 02, 2020March 02, 2020  Reason for Visit  Reason Comments   Psychiatric Evaluation per mother patient has been having mental health issues. mother report patient has been acting funny. currently takes Prozac. -SI/HI/AH/VH. denies any complaints on arrival. pts mother stated her work stated she had to be evaluated " by a psychatrist prior to returning to work. recently moved and started a new job.        Auth/Cert   Auth/Cert   Status Reason Specialty Diagnoses / Procedures Referred By Contact Referred To Contact      Diagnoses   Substance-induced psychotic disorder (HCC)   Substance Induced Psychotic Disorder   Procedures   cm      Encounter Details  Date Type Department Care Team Description   01/02/2020 - 01/03/2020 Emergency [facility] - Emergency Department   [address]   [place] [id number] [phone]   Behavior concern in adult (Primary Dx)   Social History  - documented as of this encounter   Tobacco Use Types Packs/Day Years Used Date   Never Smoker       Smokeless Tobacco: Never Used        Alcohol Use Drinks/Week oz/Week Comments   Not Currently        Alcohol Habits Answer Date Recorded   How often do you have a drink containing alcohol? Monthly or less 01/03/2020   How many drinks containing alcohol do you have on a typical day when you are drinking? 5 or 6 01/03/2020   How often do you have six or more drinks on one occasion? Monthly 01/03/2020     Social Isolation Answer Date Recorded   In a typical week, how many times do you talk on the phone with family, friends, or neighbors? Once a week 01/03/2020   How often do you get together with friends or relatives? More than three times a week 01/03/2020   How often do you attend Hinduism or Yazidi services? Never 01/03/2020   Do you belong to any clubs or organizations such as Hinduism groups, unions, fraternal or athletic groups, or school groups? No 01/03/2020   How often do you attend meetings of the clubs or organizations you belong to? Never 01/03/2020   Are you now , , , , never  or living with a partner? Living with partner 01/03/2020     Physical Activity Answer Date Recorded   On average, how many days per week do you engage in moderate to strenuous exercise (like  walking fast, running, jogging, dancing, swimming, biking, or other activities that cause a light or heavy sweat)? 0 days 01/03/2020   On average, how many minutes do you engage in exercise at this level? 0 min 01/03/2020     Stress Answer Date Recorded   Do you feel stress - tense, restless, nervous, or anxious, or unable to sleep at night because your mind is troubled all the time - these days? To some extent 01/03/2020     Education Answer Date Recorded   What is the highest level of school you have completed or the highest degree you have received? GED or equivalent 01/03/2020     Financial Resource Strain Answer Date Recorded   How hard is it for you to pay for the very basics like food, housing, medical care, and heating? Hard 01/03/2020     Intimate Partner Violence Answer Date Recorded   Within the last year, have you been afraid of your partner or ex-partner? No 01/03/2020   Within the last year, have you been humiliated or emotionally abused in other ways by your partner or ex-partner? No 01/03/2020   Within the last year, have you been kicked, hit, slapped, or otherwise physically hurt by your partner or ex-partner? No 01/03/2020   Within the last year, have you been raped or forced to have any kind of sexual activity by your partner or ex-partner? No 01/03/2020     Food Insecurity Answer Date Recorded   Within the past 12 months, you worried that your food would run out before you got money to buy more. Sometimes true 01/03/2020   Within the past 12 months, the food you bought just didn't last and you didn't have money to get more. Never true 01/03/2020     Transportation Needs Answer Date Recorded   In the past 12 months, has lack of transportation kept you from medical appointments or from getting medications? No 01/03/2020   In the past 12 months, has lack of transportation kept you from meetings, work, or getting things needed for daily living? No 01/03/2020     Sex Assigned at Birth Date Recorded  "  Not on file      Job Start Date Occupation Industry   Not on file Not on file Not on file     Travel History Travel Start Travel End   No recent travel history available.     Last Filed Vital Signs  - documented in this encounter   Vital Sign Reading Time Taken Comments   Blood Pressure 110/77 01/03/2020 6:56 AM CST    Pulse 98 01/03/2020 6:56 AM CST    Temperature 36.8 °C (98.3 °F) 01/03/2020 6:56 AM CST    Respiratory Rate 18 01/03/2020 6:56 AM CST    Oxygen Saturation 98% 01/03/2020 6:56 AM CST    Inhaled Oxygen Concentration - -    Weight 65.5 kg (144 lb 8 oz) 01/02/2020 8:08 PM CST    Height 165.1 cm (5' 5") 01/02/2020 8:08 PM CST    Body Mass Index 24.05 01/02/2020 8:08 PM CST    Medications at Time of Discharge  - documented as of this encounter   Medication Sig Dispensed Refills Start Date End Date   melatonin 3 mg Tablet  Take 1 tablet by mouth nightly as needed (sleep). 30 tablet  3 01/09/2020    buPROPion XL (WELLBUTRIN XL) 150 mg 24 hr tablet Indications: Last filled 12/17/19 Take 1 tablet by mouth daily. Indications: Last filled 12/17/19 30 tablet  3 01/09/2020 02/24/2020   clonazePAM (KlonoPIN) 0.5 mg tablet Indications: Last filled 12/17/19 Take 0.5 mg by mouth every evening. Indications: Last filled 12/17/19  0  02/24/2020   FLUoxetine (PROZAC) 40 MG capsule Indications: Last filled 12/17/19 Take 1 capsule by mouth daily. Indications: Last filled 12/17/19 30 capsule  3 01/09/2020 02/24/2020   OLANZapine (ZYPREXA) 10 mg tablet  Take 1 tablet by mouth nightly. 30 tablet  3 01/09/2020 02/24/2020   buPROPion XL (WELLBUTRIN XL) 150 mg 24 hr tablet Indications: Last filled 12/17/19 Take 150 mg by mouth daily. Indications: Last filled 12/17/19  0  01/09/2020   FLUoxetine (PROZAC) 40 MG capsule Indications: Last filled 12/17/19 Take 40 mg by mouth daily. Indications: Last filled 12/17/19  0  01/09/2020   Discharge Disposition  - documented in this encounter   Disposition Code Departure Means Destination " "  [facility]   Behavioral Health   Progress Notes  - documented in this encounter   Table of Contents for Progress Notes   Jerad Valenzuela PLPC - 01/03/2020 12:55 PM CST   Ibis Zee LCSW - 01/03/2020 11:05 AM CST   Jerad Valenzuela PLPC - 01/03/2020 12:55 PM CST   Pre-cert completed:    GLORY Sp. could not access [place]. GLORY completed Pre-cert by calling [phone] on 1/3/19 @ 5194. Spoke with Malinda and Malinda transferred GLORY to the correct department to completed pre-cert. Spoke with Lizeth Stanley found pts info. In their data base. Lizeth asked how many days does [facility] request the pt to stay inpt. GLORY Sp. requested 5 days. Auth number: [id number]. Per Lizeth DAVIS, staff with [employer] will reach out to Ashley BA during normal business days and hours.   CG   Electronically signed by Jerad Valenzuela PLPC at 01/03/2020 5:08 PM CST   Back to top of Progress Notes   Ibis Zee LCSW - 01/03/2020 11:05 AM CST   Pt admitted to [facility] under the care of Dr. Painting, per Dr. Burns. Pt's mother notified at 1105.   Electronically signed by Ibis Zee LCSW at 01/03/2020 11:06 AM CST   Back to top of Progress Notes   Consult Notes  - documented in this encounter   Gerber Hernandez DO - 01/03/2020 12:41 AM CST   Associated Order(s): Inpatient Consult to [facility]   Formatting of this note might be different from the original.  Inpatient Consult to [facility]  Consult performed by: Gerber Hernandez DO  Consult ordered by: Safia Polanco PA    Chief Complaint   Patient presents with    Psychiatric Evaluation   per mother patient has been having mental health issues. mother report patient has been acting funny. currently takes Prozac. -SI/HI/AH/VH. denies any complaints on arrival. pts mother stated her work stated she had to be evaluated by a psychatrist prior to returning to work. recently moved and started a new job.     Per patient: "Since February 4, 1994, I feel like I am my mom."    HPI: " "  History Review   Source of History: Patient, pt's mother    Alea Cooley is a 24 y.o. year old female with a past psychiatric history significant for depression with psychotic features who presented to the hospital after being sent home from work due to strange behavior, and the mother reported that the patient has been acting funny. Patient takes Prozac 40 mg p.o. daily Wellbutrin  mg p.o., and Klonopin 0.5 mg p.o. daily. UDS positive for benzodiazepines.    Per ED note: "Patient is a 24-year-old female who has a past medical history of anxiety. She reports that today while at work she felt as if she was reliving a dream. Patient reports that she has been feeling like she has been reliving the dream for a long time now. She states that her coworkers had to help her do basic functions of her job. They were to concern the point that they called her mother reporting that she needed to have a psychiatric evaluation. Patient reports taking Prozac. She denies suicidal actions, homicidal actions and hallucinations. Patient denies smoking cigarettes, abusing alcohol and/or drugs. Patient has difficulty answering simple questions and she occasionally repeats the answer to a previously asked questions. She is awake alert and oriented x3.    Initial provider exam has been performed including initial history, physical exam, and orders. The patient will be placed in a room as soon as possible with definitive evaluation by another provider. Patient presents with a complaint worsening anxiety. Moved to a new place, and got a new job. Job sent her home for acting strange and wanted her evaluated by psychiatrist. Mother thinks the new changes exacerbated her history. Denies SI/HI/AH/VH. Has seen psychiatrist in the past, but pcp has been doing med refill. Patient denies any medical complaints. "     Today, patient appears to be a poor historian and has disorganized thought processes and is tangential. Chief complaint is " "that she feels like she is her mom since February 4, 1994. She feels that "every day I have never had sleep in forever." She initially denies having taken medications which were not prescribed to her, but admits later in the interview that she took some acid which made her feel calm. Social work indicates the patient took some "purple haze." She states that she has also tried Adderall in the past, which made her sleepy. Timeframe is unclear, due to the mother not being aware that the patient has tried drugs, and the patient is contradicting herself. She admits to occasional alcohol use.    She states that her mood is "a lot better since February 4, 1994." She denies SI/HI. She denies current AVH. She states that Dr. Ramirez is her PCP and prescribes her medications.    Additional information during the interview is difficult to ascertain, as the patient is not answering questions directly.     indicates that collateral from her mother indicates that the patient had a previous episode of depression with hallucinations/psychosis about 4 years ago. Mother does not give clear timeline of how long patient has been acting "funny." Mother counted the patient's Klonopin tablets, and the patient appears to be taking them as scheduled and prescribed.    Past Medical and Surgical History  Allergies : Patient has no known allergies.    Home Medications :  Not on File    Medical : has a past medical history of Anxiety.    Surgical : has no past surgical history on file.    Past Psychiatric History  Previous hospitalizations: Previous hospitalization in [place], at unknown time. Or location.  Outpatient treatment: Patient unable to recall  Past medications: Unknown  Previous suicide attempts: Denies    Family History  Her family history is not on file.    Past family psychiatric history: Patient states that grandmother has "bipolar schizoaffective disorder"    Social History  reports that she has never smoked. " "She has never used smokeless tobacco.    Tobacco: Denies  Alcohol: Occasional use  Drugs: Admits to acid and purple haze recently, unable to remember timeline.  Withdrawal: Denies  Rehab: Denies    Living Situation: Lives in [place], [place] with boyfriend.  Occupation / Financial: Patient states that she works at [employer], is in training for cleaning monkey cages  : Denies    Review of Systems  Constitutional: Negative for chills and fever.   HENT: Negative for congestion and sore throat.   Eyes: Negative for pain and visual disturbance.   Respiratory: Negative for cough and shortness of breath.   Cardiovascular: Negative for chest pain and leg swelling.   Gastrointestinal: Negative for abdominal pain, diarrhea, nausea and vomiting.   Endocrine: Negative.   Genitourinary: Negative for dysuria and hematuria.   Musculoskeletal: Negative for back pain and neck pain.   Skin: Negative for rash.   Allergic/Immunologic: Negative.   Neurological: Negative for dizziness, weakness and numbness.   Hematological: Negative.   Psychiatric/Behavioral: Positive for behavioral problems, confusion and decreased concentration. Negative for hallucinations and suicidal ideas. The patient is nervous/anxious.   All other systems reviewed and are negative.    Per ED provider note. Agree with above with exceptions as noted in the HPI.     Objective     Constitutional   VITAL SIGNS    Patient Vitals for the past 24 hrs:  BP Temp Temp src Pulse Resp SpO2 Height Weight   01/02/20 2326 116/77 98.2 °F (36.8 °C) Oral (!) 111 20 100 % -- --   01/02/20 2008 (!) 142/83 98.1 °F (36.7 °C) Oral 89 18 100 % 165.1 cm (65") 65.5 kg (144 lb 8 oz)     Body mass index is 24.05 kg/m².    Labs Reviewed   COMPREHENSIVE METABOLIC PANEL - Abnormal   Result Value   Sodium Level 138   Potassium Level 3.8   Chloride Level 105   CO2 Level 20 (*)   Glucose Level 84   Blood Urea Nitrogen Level 9   Creatinine Level 0.77   GFR-   GFR-ALEKSANDRA 91   Calcium " Level 9.8   Protein Total 8.8 (*)   Albumin Level 4.6   Bilirubin Total 0.8   Alkaline Phosphatase Level 45   SGOT (AST) 19   SGPT (ALT) 12   Anion Gap 13   Narrative:   MDRD Calculated GFR estimate resulted by System based on Creatinine Level. Adjusted for gender and age. Results calculated in ml/min/1.73mSquared. Reference Range: >= 60 ml/min/1.73mSquared.   URINALYSIS - Abnormal   COLOR Dark Yellow   CLARITY Slightly Cloudy (*)   SPECIFIC GRAVITY UA 1.029   GLUCOSE UA Negative   PROTEIN UA 30 (*)   BILIRUBIN URINE Negative   UROBILINOGEN URINE Negative   PH UA 5   HGB URINE Negative   KETONES UA 80 (*)   NITRITE UA Negative   LEUKOCYTE ESTERASE UA Negative   MICROSCOPIC NEEDED? Yes   WBC UA 0-5   Blood UA 0-1   EPITHELIAL URINE 5-10 (*)   BACTERIA None Seen   MUCUS Present (*)   URINE COLLECTION SOURCE Urine Clean Catch   DRUG SCREEN URINE (DOA) - Abnormal   Ecstasy Urine Negative   Amphetamines Screen Urine Negative   Barbiturates Screen Urine Negative   Benzodiazepine Screen Urine Positive (*)   THC Cannabinoid Screen Urine Negative   Cocaine Screen Urine Negative   Opiates Screen Urine Negative   Methadone Urine Negative   Propoxyphene, Urine Negative   Narrative:   Detectable Level for Amphetamines: >1000 ng/mL  Detectable Level for Barbiturates: >200 ng/mL  Detectable Level for Benzodiazepines: >200 ng/mL  Detectable Level for Cannabinoids: >50 ng/mL  Detectable Level for Cocaine: >300 ng/mL  Detectable Level for Ecstacy: >500 ng/mL  Detectable Level for Methadone: >300 ng/mL  Detectable Level for Opiates: >300 ng/mL  Detectable Level for Propoxyphene: >300 ng/mL    These assays are intended for use in clinical laboratories. Results are to be used for Medical Purposes only. Unconfirmed screening results must not be used for non-medical purposes. These assays provide only preliminary analytical test results. A more specific alternate chemical method must be used to obtain a confirmed analytical result. Gas  Chromatography/Mass Spectrophotometry (GC/MS) is the preferred confirmatory method. Clinical consideration and professional judgement should be applied to any drug of abuse test result, particularly when preliminary positive results are used.    NOTE - Ecstasy results may be positive due to cross-reactivity with statin drugs such as Fenofibrate.   CBC WITH AUTO DIFFERENTIAL - Abnormal   WHITE BLOOD CELL COUNT 7.8   RED BLOOD CELL COUNT 4.78   HEMOGLOBIN 13.5   HEMATOCRIT 40.8   MEAN CORPUSCULAR VOLUME 85   MEAN CORPUSCULAR HEMOGLOBIN CONC 33.1   RED CELL DISTRIBUTION WIDTH 11.5 (*)   PLATELET COUNT 228   MEAN PLATELET VOLUME 8.6   Neutrophils Abs 5.2   Lymphocytes Abs 1.7   Monocytes Abs 0.7   Eosinophils Abs 0.0   Basophils Abs 0.1   Neutrophils % 67   Lymphocytes % 22   Monocytes % 9   Eosinophils % 1   Basophils % 1   GILEAD SCREEN - Normal   HIV Combo (Antigen/Antibody) Non-reactive   Narrative:   The HIV Ag/Ab Combo assay is a 4th generation HIV test capable of detecting both HIV p24 antigen and HIV 1/2 Antibodies. The assay may produce a positive result as early as 7-10 days after initial infection.    Assay performance has not been validated for children less than 2 years of age.  HIV-1 antigen and HIV-1/HIV-2 antibodies were not detected. No laboratory evidence of HIV infection. No further testing of the sample is indicated. If recent HIV exposure is suspected, redraw and repeat.   ETHANOL - Normal   Alcohol Level <10.0   PREGNANCY, URINE - Normal   Pregnancy Test Urine Negative   Narrative:   Negative results are expected in healthy, non-pregnant women and healthy men. Healthy pregnant women will have varying levels of hCG present based on gestational age and between individuals.   CBC AND DIFFERENTIAL   Narrative:   The following orders were created for panel order CBC and differential.  Procedure Abnormality Status   --------- ----------- ------   CBC auto differential[[id number]] Abnormal Final result      Please view results for these tests on the individual orders.     Scheduled Meds: Prozac 40 mg p.o. daily, Wellbutrin  mg p.o. daily, and Klonopin 0.5 mg p.o. daily.    PRN Meds: acetaminophen, benztropine, haloperidol, haloperidol lactate, LORazepam, LORazepam, nicotine, traZODone    GENERAL APPEARANCE: GROOMING: Disheveled    Musculoskeletal Exam:  Muscle Strength & Tone: no deficits  Presence of atrophy: No   Abnormal movements: No   Gait & Station: steady gait    Mental Status Exam:  Speech: normal volume, normal tone and slow rate  Coherence: Patient is intermittently incoherent, and answers questions inappropriately with February 4, 1995.  Articulation: Clear  Spontaneity: Yes   Mood: Depressed - No   Affect: Euthymic  Thought of Processes: Illogical and Disorganized  Associations: Tangential - Yes   Thought of Content: Hallucinations - No , Delusions - No , Suicidal - No , Homicidal - No , Obsessions - No and Compulsions - No   Judgment: Impaired  Insight: Impaired  Orientation: person, place and time/date  Memory: Remembers 0/3 words after 3 to 5 minutes.  Attention Span: distractable  Concentration: decreased  Language: no aphasia or anomia  Fund of Knowledge: appropriate for level of education    Physical Exam  Vitals signs and nursing note reviewed.   Constitutional:   General: She is not in acute distress.  Appearance: Normal appearance. She is well-developed. She is not ill-appearing or toxic-appearing.   HENT:   Head: Normocephalic and atraumatic.   Eyes:   Conjunctiva/sclera: Conjunctivae normal.   Pupils: Pupils are equal, round, and reactive to light.   Neck:   Musculoskeletal: Normal range of motion and neck supple.   Cardiovascular:   Rate and Rhythm: Normal rate and regular rhythm.   Heart sounds: Normal heart sounds.   Pulmonary:   Effort: Pulmonary effort is normal.   Breath sounds: Normal breath sounds.   Abdominal:   General: Bowel sounds are normal. There is no distension.  "  Palpations: Abdomen is soft.   Tenderness: There is no tenderness. There is no guarding.   Musculoskeletal: Normal range of motion.   Skin:  General: Skin is warm and dry.   Neurological:   General: No focal deficit present.   Mental Status: She is alert and oriented to person, place, and time.   Psychiatric:   Attention and Perception: She is inattentive. She does not perceive auditory or visual hallucinations.   Mood and Affect: Mood is anxious. Affect is inappropriate.   Speech: Speech is delayed.   Behavior: Behavior normal.   Thought Content: Thought content does not include homicidal or suicidal ideation.   Cognition and Memory: Cognition is impaired. She exhibits impaired recent memory.   Judgment: Judgment is impulsive and inappropriate.     Per ED provider note. Agree with above with exceptions as noted in HPI and MSE.    IMPRESSION  Alea Cooley is a 24 y.o. year old female with a past psychiatric history significant for depression with psychotic features who presented to the hospital after being sent home from work due to strange behavior, and the mother reported that the patient has been acting funny. Patient takes Prozac 40 mg p.o. daily Wellbutrin  mg p.o., and Klonopin 0.5 mg p.o. daily. UDS positive for benzodiazepines. On assessment, the patient is very disorganized and is unable to answer some questions coherently. After some repetition of questions, the patient admits to taking acid and purple haze in the recent day or so before admission. The timeline is questionable, as the patient is a poor historian. Mother is only able to state that the patient was sent home from work and had been acting "funny." Mother does not believe that the patient does drugs and has counted her prescribed klonopin tablets (appears to be taking as Rx'd). Patient was tangential and remembered 0/3 words after 3 to 5 minutes. Attention/concentration was intact with save a haart testing (1 error). Patient denies AVH " or SI/HI. Mood euthymic. Patient placed under observation until the morning when she can be reevaluated for potential clearing of drugs. With a history of depression with psychotic features, patient may warrant inpatient psychiatric treatment if condition does not improve.    DIAGNOSES  Principal Problem:  Likely Polysubstance abuse (HCC)    PLAN  1. PEC placed. Place patient under observation overnight. Reevaluate in the morning to see if patient's condition improves with possible inpatient psychiatric admission if no improvement seen.  2. PRN medication available for anxiety, agitation and sleep.  3. ED provider to address medical concerns as needed.     I believe the above interventions will reasonably improve the patient's condition (s).    Gerber Hernandez,  1/3/2020 1:51 AM    Electronically signed by Mo Burns MD at 01/03/2020 10:57 AM CST   Associated attestation - Mo Burns MD - 01/03/2020 10:57 AM CST   TEACHING ATTESTATION  Pt seen and examined; chart was reviewed in detail. I spoke with [Dr Hernandez] and reviewed the case. I agree with the impression and plan of care with the following exceptions. UDS+ for BZDs (Rx'd by psychiatrist). BAL was negative. Contact was made with [facility] psychiatrist ([phone]). Pt mentioned hx of abusing synTHC to Dr Malachi Arzola in the past. Dr Feliz said they'd mainly been focusing on anxiety and pt was not being Rx'd any antipsychotics at this point in time. The patient stated that last night her significant other provided her with an unknown substance. Pt stated that she believed she had a panic attack while at work after seeing blood coming from a primate who was menstruating. The patient acknowledged previously consuming mojo prior to her rash of hospitalizations as a youth and stated she feels as though it permanently impacted her mental well-being. Impression for now is unspecified mood disorder, SIPD, unspec anxiety disorder, and history of  "bipolar illness. For now recommend continuing the PEC and pursuing inpt placement; will transfer to [facility] for Dr Painting. Additional medical concerns per ED staff.    Social Hx  Mother is Jayda Cooley ([phone]). Friend is Issac ([phone]). Patient stated that she dropped out of high school but later acquired a GED. 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 Patient stated that she previously worked at [employer] but switched to the [facility] [employer] [place]. Address is listed in [place], [place]. Pt identified as Bry Rios.    Past Psych Hx  Patient [facility]- (16 years old at the time) allegedly related to command hallucinations and destructive behavior according to emergency department documentation. Drug screen and blood alcohol level were negative. According to family the patient was last hospitalized in 2010 at [facility]. Patient claimed that she was hearing voices telling her to set things on fire. Patient removed light bulbs from light fixtures in the home because she thought someone was watching her. Patient went on a PEC to [facility] in [place].    Patient presented to [facility] 8-1-2011 after allegedly consuming 40 tablets of ibuprofen 200 mg per patient said that she had been feeling bad for herself and was lonely. Drug screen was negative. Patient went on a PEC to [facility] in [place].    Patient presented to the [facility] 9- stating she was hearing voices telling her to cut and or burn herself. Drug screen was negative. Blood level was negative. Patient was sent on a PEC to [place].    Patient presented to the [facility] 7- after family called law enforcement due to "erratic behavior." Family said the patient had not taken any psychiatric occasions in a year. Drug screen and blood cultures were negative. Patient was sent on a PEC to [facility].    Pt presented to the [facility] 10-4-2016 for "depression and " "suicidal thoughts." Pt was seen by Dr Howell / Dr Mckay. Drug screen and blood alcohol level were negative. Decision was made to admit psychiatrically. Pt was sent on a PEC to [place].    Pt was seen by Dr Mares 7- after she complained of anxiety. Pt said she saw Dr Centeno on outpt setting previously but stopped due to long wait times. Mother said pt had prior dx of bipolar illness but overall collateral information was not worrisome. Pt was discharged with resources.    According to the , the patient has been following up with Dr. Malachi Feliz (Psychiatry at [facility]) since November 2018.   ED Notes  - documented in this encounter   Eduardo Seaman PA - 01/02/2020 8:07 PM CST   Formatting of this note might be different from the original.  History  Chief Complaint   Patient presents with    Psychiatric Evaluation   per mother patient has been having mental health issues. mother report patient has been acting funny. currently takes Prozac. -SI/HI/AH/VH. denies any complaints on arrival. pts mother stated her work stated she had to be evaluated by a psychatrist prior to returning to work. recently moved and started a new job.       Patient is a 24-year-old female who has a past medical history of anxiety. She reports that today while at work she felt as if she was reliving a dream. Patient reports that she has been feeling like she has been reliving the dream for a long time now. She states that her coworkers had to help her do basic functions of her job. They were to concern the point that they called her mother reporting that she needed to have a psychiatric evaluation. Patient reports taking Prozac. She denies suicidal actions, homicidal actions and hallucinations. Patient denies smoking cigarettes, abusing alcohol and/or drugs. Patient has difficulty answering simple questions and she occasionally repeats the answer to a previously asked questions. She is awake alert and oriented " x3.    Initial provider exam has been performed including initial history, physical exam, and orders. The patient will be placed in a room as soon as possible with definitive evaluation by another provider. Patient presents with a complaint worsening anxiety. Moved to a new place, and got a new job. Job sent her home for acting strange and wanted her evaluated by psychiatrist. Mother thinks the new changes exacerbated her history. Denies SI/HI/AH/VH. Has seen psychiatrist in the past, but pcp has been doing med refill. Patient denies any medical complaints.     Safia CAREY PA, performed this IPE on 01/02/20 at 8:08 PM.      Past Medical History:   Diagnosis Date    Anxiety         No past surgical history on file.    Social History     Tobacco Use    Smoking status: Never Smoker    Smokeless tobacco: Never Used   Substance Use Topics    Alcohol use: Not on file    Drug use: Not on file       No Known Allergies     Review of Systems   Constitutional: Negative for chills and fever.   HENT: Negative for congestion and sore throat.   Eyes: Negative for pain and visual disturbance.   Respiratory: Negative for cough and shortness of breath.   Cardiovascular: Negative for chest pain and leg swelling.   Gastrointestinal: Negative for abdominal pain, diarrhea, nausea and vomiting.   Endocrine: Negative.   Genitourinary: Negative for dysuria and hematuria.   Musculoskeletal: Negative for back pain and neck pain.   Skin: Negative for rash.   Allergic/Immunologic: Negative.   Neurological: Negative for dizziness, weakness and numbness.   Hematological: Negative.   Psychiatric/Behavioral: Positive for behavioral problems, confusion and decreased concentration. Negative for hallucinations and suicidal ideas. The patient is nervous/anxious.   All other systems reviewed and are negative.      Physical Exam  ED Triage Vitals [01/02/20 2008]   Temp Pulse Resp BP SpO2   98.1 °F (36.7 °C) 89 18 (!) 142/83 100 %       Physical  "Exam  Vitals signs and nursing note reviewed.   Constitutional:   General: She is not in acute distress.  Appearance: Normal appearance. She is well-developed. She is not ill-appearing or toxic-appearing.   HENT:   Head: Normocephalic and atraumatic.   Eyes:   Conjunctiva/sclera: Conjunctivae normal.   Pupils: Pupils are equal, round, and reactive to light.   Neck:   Musculoskeletal: Normal range of motion and neck supple.   Cardiovascular:   Rate and Rhythm: Normal rate and regular rhythm.   Heart sounds: Normal heart sounds.   Pulmonary:   Effort: Pulmonary effort is normal.   Breath sounds: Normal breath sounds.   Abdominal:   General: Bowel sounds are normal. There is no distension.   Palpations: Abdomen is soft.   Tenderness: There is no tenderness. There is no guarding.   Musculoskeletal: Normal range of motion.   Skin:  General: Skin is warm and dry.   Neurological:   General: No focal deficit present.   Mental Status: She is alert and oriented to person, place, and time.   Psychiatric:   Attention and Perception: She is inattentive. She does not perceive auditory or visual hallucinations.   Mood and Affect: Mood is anxious. Affect is inappropriate.   Speech: Speech is delayed.   Behavior: Behavior normal.   Thought Content: Thought content does not include homicidal or suicidal ideation.   Cognition and Memory: Cognition is impaired. She exhibits impaired recent memory.   Judgment: Judgment is impulsive and inappropriate.       ED Course and Medical Decision Making   Provider First Evaluation Time: 01/02/20 2007    Vitals:   01/02/20 2008   BP: (!) 142/83   Pulse: 89   Resp: 18   Temp: 98.1 °F (36.7 °C)   TempSrc: Oral   SpO2: 100%   Weight: 65.5 kg (144 lb 8 oz)   Height: 165.1 cm (65")         Procedures    Orders Placed This Encounter   Procedures    Inpatient Consult to [facility]   Standing Status: Standing   Number of Occurrences: 1   Order Specific Question: Reason for Consult?   Answer: worsening anxiety, " abnormal behavior       Labs Reviewed   COMPREHENSIVE METABOLIC PANEL - Abnormal   Result Value   Sodium Level 138   Potassium Level 3.8   Chloride Level 105   CO2 Level 20 (*)   Glucose Level 84   Blood Urea Nitrogen Level 9   Creatinine Level 0.77   GFR-   GFR-ALEKSANDRA 91   Calcium Level 9.8   Protein Total 8.8 (*)   Albumin Level 4.6   Bilirubin Total 0.8   Alkaline Phosphatase Level 45   SGOT (AST) 19   SGPT (ALT) 12   Anion Gap 13   Narrative:   MDRD Calculated GFR estimate resulted by System based on Creatinine Level. Adjusted for gender and age. Results calculated in ml/min/1.73mSquared. Reference Range: >= 60 ml/min/1.73mSquared.   URINALYSIS - Abnormal   COLOR Dark Yellow   CLARITY Slightly Cloudy (*)   SPECIFIC GRAVITY UA 1.029   GLUCOSE UA Negative   PROTEIN UA 30 (*)   BILIRUBIN URINE Negative   UROBILINOGEN URINE Negative   PH UA 5   HGB URINE Negative   KETONES UA 80 (*)   NITRITE UA Negative   LEUKOCYTE ESTERASE UA Negative   MICROSCOPIC NEEDED? Yes   WBC UA 0-5   Blood UA 0-1   EPITHELIAL URINE 5-10 (*)   BACTERIA None Seen   MUCUS Present (*)   URINE COLLECTION SOURCE Urine Clean Catch   DRUG SCREEN URINE (DOA) - Abnormal   Ecstasy Urine Negative   Amphetamines Screen Urine Negative   Barbiturates Screen Urine Negative   Benzodiazepine Screen Urine Positive (*)   THC Cannabinoid Screen Urine Negative   Cocaine Screen Urine Negative   Opiates Screen Urine Negative   Methadone Urine Negative   Propoxyphene, Urine Negative   Narrative:   Detectable Level for Amphetamines: >1000 ng/mL  Detectable Level for Barbiturates: >200 ng/mL  Detectable Level for Benzodiazepines: >200 ng/mL  Detectable Level for Cannabinoids: >50 ng/mL  Detectable Level for Cocaine: >300 ng/mL  Detectable Level for Ecstacy: >500 ng/mL  Detectable Level for Methadone: >300 ng/mL  Detectable Level for Opiates: >300 ng/mL  Detectable Level for Propoxyphene: >300 ng/mL    These assays are intended for use in clinical laboratories.  Results are to be used for Medical Purposes only. Unconfirmed screening results must not be used for non-medical purposes. These assays provide only preliminary analytical test results. A more specific alternate chemical method must be used to obtain a confirmed analytical result. Gas Chromatography/Mass Spectrophotometry (GC/MS) is the preferred confirmatory method. Clinical consideration and professional judgement should be applied to any drug of abuse test result, particularly when preliminary positive results are used.    NOTE - Ecstasy results may be positive due to cross-reactivity with statin drugs such as Fenofibrate.   CBC WITH AUTO DIFFERENTIAL - Abnormal   WHITE BLOOD CELL COUNT 7.8   RED BLOOD CELL COUNT 4.78   HEMOGLOBIN 13.5   HEMATOCRIT 40.8   MEAN CORPUSCULAR VOLUME 85   MEAN CORPUSCULAR HEMOGLOBIN CONC 33.1   RED CELL DISTRIBUTION WIDTH 11.5 (*)   PLATELET COUNT 228   MEAN PLATELET VOLUME 8.6   Neutrophils Abs 5.2   Lymphocytes Abs 1.7   Monocytes Abs 0.7   Eosinophils Abs 0.0   Basophils Abs 0.1   Neutrophils % 67   Lymphocytes % 22   Monocytes % 9   Eosinophils % 1   Basophils % 1   ETHANOL - Normal   Alcohol Level <10.0   PREGNANCY, URINE - Normal   Pregnancy Test Urine Negative   Narrative:   Negative results are expected in healthy, non-pregnant women and healthy men. Healthy pregnant women will have varying levels of hCG present based on gestational age and between individuals.   CBC AND DIFFERENTIAL   Narrative:   The following orders were created for panel order CBC and differential.  Procedure Abnormality Status   --------- ----------- ------   CBC auto differential[[id number]] Abnormal Final result     Please view results for these tests on the individual orders.   GILEAD SCREEN       Medications - No data to display    No orders to display       No results found.    ED Course as of Jan 02 2115   Thu Jan 02, 2020 2115 This patient was seen by RUEL only for psychiatric medical clearance.  Medically cleared for disposition deemed appropriate by psychiatric team.      [RW]     ED Course User Index  [RW] Eduardo Seaman PA       MDM        Diagnosis  1. Behavior concern in adult       Disposition and Plan  This patient was seen by REUL only for psychiatric medical clearance. Medically cleared for disposition deemed appropriate by psychiatric team.      New Prescriptions   No medications on file           Safia Polanco PA  01/02/20 2011      Arie Esparza NP  01/02/20 2050      Eduardo Seaman PA  01/02/20 2116  Electronically signed by Thomas Stevens MD at 01/02/2020 10:25 PM CST   Associated attestation - Thomas Stevens MD - 01/02/2020 10:25 PM CST   I, Dr. Stevens, did not see this patient independently. I am signing the chart as the corroborating physician. I was available for consult.   Miscellaneous Notes  - documented in this encounter   Care Coordination - Cha Sanchez LCSW - 01/03/2020 4:05 AM CST   Formatting of this note might be different from the original.    01/02/20 2250   Histories  Primary Contact for Participation in Treatment Self   Consent Signed for Primary Contact? No; pt. Gives verbal permission to speak with Mother.   Marital Status Single   Living Situation Pt. Lives with her boyfriend, Wyatt Waite [phone].   Identified Supports Pt. identifies her Mother, Jayda Cooley [phone] as a good support.   Current medications   Current Facility-Administered Medications:    acetaminophen (TYLENOL) tablet 650 mg, 650 mg, Oral, Q6H PRN, Gerber Hernandez, DO   benztropine (COGENTIN) tablet 1 mg, 1 mg, Oral, Q6H PRN, Gerber Hernandez, DO   haloperidol (HALDOL) tablet 5 mg, 5 mg, Oral, Q6H PRN, Gerber Hernandez, DO, 5 mg at 01/02/20 7492   haloperidol lactate (HALDOL) injection 5 mg, 5 mg, Intramuscular, Q6H PRN, Gebrer Hernandez, DO   LORazepam (ATIVAN) injection 1 mg, 1 mg, Intramuscular, Q4H PRN, Gerber Hernandez, DO   LORazepam (ATIVAN) tablet 1 mg, 1 mg,  "Oral, Q6H PRN, Gerber Hernandez DO, 1 mg at 01/02/20 2345   nicotine (NICODERM CQ) 21 mg/24 hr 1 patch, 1 patch, Transdermal, Daily PRN, Gerber Hernandez DO   traZODone (DESYREL) tablet 50 mg, 50 mg, Oral, Nightly PRN, Gerber Hernandez DO, 50 mg at 01/02/20 2345  No current outpatient medications on file.    Pt.'s Mother has medication bottles dated 12/17/19 prescribed by Dr. Feliz: Bupropion XL 150mg, by mouth, daily; Prozac 40mg, by mouth daily; and clonazepam 0.5mg, by mouth, daily.   Medication Compliance (per patient) Pt. Reports compliance. Pt. Has between #14 to 17 pills in each bottle and appears to be taking medications.   Medication Compliance (per collateral) Pt.'s Mother reports pt. Takes her medications. Pt.'s Mother reports the dosages have been the same for the last four or five years.   Tobacco Use Social History     Tobacco Use   Smoking Status Never Smoker   Smokeless Tobacco Never Used       Drug Use Pt. Reports placing Acid under her tongue at 0600 01/02/2020. Pt. also reports using "Purple Haze" but articulated this as synthetic marijuana. This is possibly strain of marijuana known as Purple Haze. Mother reports no drug use in pt.   Alcohol Use Pt. Denies.   Family Psychiatric and Addiction History     Psychiatric/Addiction History Previous treatment  (Pt. to [facility] 7/31/2017 for passive SI, stress at work, was discharged home.)   Most recent hospitalization at [facility] 10/2016   Reason for hospitalization Hallucinations   Multiple previous hospitalizations [facility] and [facility], timeframe unknown   Current Outpatient Physician Pt. does not currently see an outpatient psychiatrist. Pt.'s Mother reports pt.'s medications are filled by PCP Dr. Malachi Feliz.   Current Outpatient Therapist Pt. does not currently see a therapist.   Reason for outpatient treatment/therapy Pt.'s Mother reports pt. was diagnosed with Depression with psychotic features by Dr. Centeno at [facility] about " "four years ago.   Assessment   Patient Complaints Confusion;Decreased concentration;Sleep disturbance;Substance abuse   Person Reporting Patient;Accompanying person (Comment)  (Pt. is accompanied by her Mother, Jayda Cooley.)   Today's precipitating factor Pt. is a 24 y.o. female to ED with Mother. Pt. exhibiting bizarre behavior. Pt.'s Mother reports receiving a call from pt.'s boyfriend stating pt. Was exhibiting odd behavior at work, sitting on the floor, not responding to co-workers unless they came to her, touched her arm, and said her name, speaking directly to her. Pt. reports working at the [employer]. Pt. reports she has worked there since 1994 (pt.'s birth date). Pt.'s Mother reports pt. has worked there since 2019. Pt. Moved to [place] from [place] at the end of November also. Pt.'s Mother believes pt.'s bizarre behavior to be a reaction to depression and stress. Pt.'s Mother expresses pt. With depression with psychotic features about four or five years ago and believes this is similar presentation.     Pt. Also reports dissociation of not being herself and reports Mother is trying to be pt.     Pt. Is able to state her name, D.O.B, the month and year, the current president and has good recall. Pt. Oriented X4. Pt. Does exhibit poor concentration, confusion, and reports every significant sate as her      In [facility], pt. threw away her clothes and shoes stating she doesn't need them because they are all apart of the same day she has been living over and over. Pt. Also states she is having nightmares. Pt. reports something being wrong with time, feeling like she is late, and feeling like "something is telling me to hurry up." Pt. denies AVH at this time.   Significant Problem 1 Chronic mental illness   Significant Problem 2 Substance abuse   Symptoms/Complications   Detox   (Pt. denies current detox symptoms. Pt. denies history of seizures.)   Patterns of use   (Pt. " "reports she does not use daily. Pt. does report use on 01/02/2020.)   Employment/School Problems   (Pt.'s boyfriend called by pt.'s employer because of pt.'s behavior at work.)   Family report of use   (Family (Mother) reports pt. does not use drugs.)   Objective   Eating Patterns No disturbance in eating pattern noted   Sleep Patterns Nightmares;Frequent waking  (Pt. reports she sleeps from 8pm to 5am, but wakes frequently. Pt. reports having nightmares.)   Strengths Family support   Gravely Disabled Severe impairment in functioning  (Pt. appears impaired, confused, states "I don't know" to many interview questions. Pt. states Mother has info.)   Major Life Area Stressors   (Pt. reports stressor of having nightmare of living the same day over and over.)   Affect/Mood Range Constricted   General Appearance Less than stated age   General Attitude Cooperative   Behavior Withdrawn   Cognition Alert;Oriented to person;Oriented to place;Oriented to time;Oriented to situation;Recall intact   Speech   (Coherent)   Thought Organization/Content Bizarre thoughts;Loose associations;Repetitive   Mood Depressed   Protective Factors Family   Risk Factors:Risk to Self   (Pt. denies SI. Pt. denies previous attempts.)   Risk Factors: Risk to Others   (Pt. denies HI.)   Plan   Clinical Impression Stimulant use disorder;Unspecified depressive disorder   Psychosocial and Environmental Factors Chronic mental illness   Physician Consulted Dr. Gerber Hernandez   Admission Criteria   (.)   Disposition   (Observation)   Legal Status PEC   Electronically signed by Cha Sanchez LCSW at 01/03/2020 7:41 AM CST   Plan of Treatment  - documented as of this encounter   Not on file   Procedures  - documented in this encounter   Procedure Name Priority Date/Time Associated Diagnosis Comments   GILEAD HIV SCREEN STAT 01/02/2020 8:35 PM CST  Results for this procedure are in the results section.    CBC WITH AUTO DIFFERENTIAL STAT 01/02/2020 8:35 " PM CST  Results for this procedure are in the results section.    PREGNANCY, URINE STAT 01/02/2020 8:35 PM CST  Results for this procedure are in the results section.    DRUG SCREEN URINE (DOA) STAT 01/02/2020 8:35 PM CST  Results for this procedure are in the results section.    URINALYSIS STAT 01/02/2020 8:35 PM CST  Results for this procedure are in the results section.    CBC AND DIFFERENTIAL STAT 01/02/2020 8:35 PM CST  Results for this procedure are in the results section.    ETHANOL STAT 01/02/2020 8:35 PM CST  Results for this procedure are in the results section.    COMPREHENSIVE METABOLIC PANEL STAT 01/02/2020 8:35 PM CST  Results for this procedure are in the results section.    Lab Results  - documented in this encounter   Table of Contents for Lab Results   CBC auto differential (01/02/2020 8:35 PM CST)   Pregnancy, urine (01/02/2020 8:35 PM CST)   Toxicology screen, urine (01/02/2020 8:35 PM CST)   Urinalysis - Urine Clean Catch (01/02/2020 8:35 PM CST)   Ethanol (01/02/2020 8:35 PM CST)   Comprehensive metabolic panel (01/02/2020 8:35 PM CST)   CBC and differential (01/02/2020 8:35 PM CST)   [place] Screen (01/02/2020 8:35 PM CST)     CBC auto differential (01/02/2020 8:35 PM CST)   CBC auto differential (01/02/2020 8:35 PM CST)   Component Value Ref Range Performed At Pathologist Signature   WHITE BLOOD CELL COUNT 7.8 4.0 - 11.0 1000/ul [facility]    RED BLOOD CELL COUNT 4.78 3.80 - 5.30 mill/uL [facility]    HEMOGLOBIN 13.5 12.0 - 16.0 gm/dl [facility]    HEMATOCRIT 40.8 37.0 - 47.0 % [facility]    MEAN CORPUSCULAR VOLUME 85 80 - 100 fl [facility]    MEAN CORPUSCULAR HEMOGLOBIN CONC 33.1 31.0 - 37.0 gm/dl [facility]    RED CELL DISTRIBUTION WIDTH 11.5 (L) 12.1 - 14.9 % [facility]    PLATELET COUNT 228 150 - 375 1000/ul [facility]    MEAN PLATELET VOLUME 8.6 6.5 - 12.0 fl [facility]    Neutrophils Abs 5.2 1.5 - 10.0 1000/ul [facility]    Lymphocytes Abs 1.7 1.3 - 2.9 1000/ul [facility]    Monocytes Abs 0.7 0.1 - 1.0 1000/ul [facility]    Eosinophils Abs 0.0 0.0 - 0.7 1000/ul [facility]    Basophils Abs 0.1 0.0 - 0.1 1000/ul [facility]    Neutrophils % 67 44 - 81 % [facility]    Lymphocytes % 22 21 - 47 % [facility]    Monocytes % 9 2 - 11 % [facility]    Eosinophils % 1 0 - 7 % [facility]    Basophils % 1 0 - 1 % [facility]      CBC auto differential (01/02/2020 8:35 PM CST)   Specimen   Blood - Vein     CBC auto differential (01/02/2020 8:35 PM CST)   Performing Organization Address City/[facility] [address]/[facility] Phone Number   [facility]  [facility].  [place], [place] [id number] [phone]    Back to top of Lab Results       Pregnancy, urine (01/02/2020 8:35 PM CST)   Pregnancy, urine (01/02/2020 8:35 PM CST)   Component Value Ref Range Performed At Pathologist Signature   Pregnancy Test Urine Negative Negative [facility]      Pregnancy, urine (01/02/2020 8:35 PM CST)   Specimen   Urine Clean Catch - Urine, Clean Catch     Pregnancy, urine (01/02/2020 8:35 PM CST)   Narrative Performed At   Negative results are expected in healthy, non-pregnant women and healthy men. Healthy pregnant women will have varying levels of hCG present based on gestational age and between individuals.  [facility]      Pregnancy, urine (01/02/2020 8:35 PM CST)   Performing Organization Address City/[facility] [address]/[facility] Phone Number   [facility]  [address] [facility] [address] [place]  [place], [place] [address] [phone]    Back to top of Lab Results       Toxicology screen, urine (01/02/2020 8:35 PM CST)   Toxicology screen, urine (01/02/2020 8:35 PM CST)   Component Value Ref Range Performed At Pathologist Signature   Ecstasy Urine Negative Negative   [facility]    Amphetamines Screen Urine Negative Negative  [facility]    Barbiturates Screen Urine Negative Negative  [facility]    Benzodiazepine Screen Urine Positive (A) Negative  [facility]    THC Cannabinoid Screen Urine Negative Negative  [facility]    Cocaine Screen Urine Negative Negative  [facility]    Opiates Screen Urine Negative Negative  [facility]    Methadone Urine Negative Negative  [facility]    Propoxyphene, Urine Negative Negative  [facility]      Toxicology screen, urine (01/02/2020 8:35 PM CST)   Specimen   Urine Clean Catch - Urine, Clean Catch     Toxicology screen, urine (01/02/2020 8:35 PM CST)   Narrative Performed At   Detectable Level for Amphetamines: >1000 ng/mL   Detectable Level for Barbiturates: >200 ng/mL   Detectable Level for Benzodiazepines: >200 ng/mL   Detectable Level for Cannabinoids: >50 ng/mL   Detectable Level for Cocaine: >300 ng/mL   Detectable Level for Ecstacy: >500 ng/mL   Detectable Level for Methadone: >300 ng/mL   Detectable Level for Opiates: >300 ng/mL   Detectable Level for Propoxyphene: >300 ng/mL   These assays are intended for use in clinical laboratories. Results are to be used for Medical Purposes only. Unconfirmed screening results must not be used for non-medical purposes. These assays provide only preliminary analytical test results. A more specific alternate chemical method must be used to obtain a confirmed analytical result. Gas Chromatography/Mass Spectrophotometry (GC/MS) is the preferred confirmatory method. Clinical consideration and professional judgement should be applied to any drug of abuse test result, particularly when preliminary positive results are used.   NOTE - Ecstasy results may be positive due to cross-reactivity with statin drugs such as Fenofibrate.  [facility]      Toxicology screen, urine  (01/02/2020 8:35 PM CST)   Performing Organization Address City/[facility] Phone Number   [facility]  [place] [id number] [phone]    Back to top of Lab Results       Urinalysis - Urine Clean Catch (01/02/2020 8:35 PM CST)   Urinalysis - Urine Clean Catch (01/02/2020 8:35 PM CST)   Component Value Ref Range Performed At Pathologist Signature   COLOR Dark Yellow Colorless, Light Yellow, Yellow, Dark Yellow, Straw, Trisha [facility]    CLARITY Slightly Cloudy (A) Clear [facility]    SPECIFIC GRAVITY UA 1.029  1.001 - 1.035 [facility]    GLUCOSE UA Negative Negative mg/dL [facility]    PROTEIN UA 30 (A) Negative mg/dL [facility]    BILIRUBIN URINE Negative Negative [facility]    UROBILINOGEN URINE Negative Negative E.U./DL [facility]    PH UA 5  5 - 8 [facility]    HGB URINE Negative Negative [facility]    KETONES UA 80 (A) Negative MG/DL [facility]    NITRITE UA Negative Negative [facility]    LEUKOCYTE ESTERASE UA Negative Negative [facility]    MICROSCOPIC NEEDED? Yes  [facility]    WBC UA 0-5 None, 0-5 /hpf [facility]    Blood UA 0-1 None Seen, 0-1, 1-5 /hpf [facility]    EPITHELIAL URINE 5-10 (A) None Seen, 0-1, 2-3, 3-5 [facility]    BACTERIA None Seen None Seen /hpf [facility]    MUCUS Present (A) NONE [facility]    URINE COLLECTION SOURCE Urine Clean Catch  [facility]      Urinalysis - Urine Clean Catch (01/02/2020 8:35 PM CST)   Specimen   Urine Clean Catch - Urine, Clean Catch     Urinalysis - Urine Clean Catch (01/02/2020 8:35 PM CST)   Performing Organization Address City/[facility] Phone Number   [facility]  [address] [place] [address] [phone]    Back to top of Lab  Results       Ethanol (01/02/2020 8:35 PM CST)   Ethanol (01/02/2020 8:35 PM CST)   Component Value Ref Range Performed At Pathologist Signature   Alcohol Level <10.0 0.0 - 10.0 mg/dL [facility]      Ethanol (01/02/2020 8:35 PM CST)   Specimen   Blood - Vein     Ethanol (01/02/2020 8:35 PM CST)   Performing Organization Address City/State/Zipcode Phone Number   [facility]  [address] [facility] [place] [address] [phone]    Back to top of Lab Results       Comprehensive metabolic panel (01/02/2020 8:35 PM CST)   Comprehensive metabolic panel (01/02/2020 8:35 PM CST)   Component Value Ref Range Performed At Pathologist Signature   Sodium Level 138 136 - 145 mmol/L [facility]    Potassium Level 3.8 3.5 - 5.1 mmol/L [facility]    Chloride Level 105 100 - 109 mmol/L [facility]    CO2 Level 20 (L) 22 - 33 mmol/L [facility]    Glucose Level 84 70 - 100 mg/dL [facility]    Blood Urea Nitrogen Level 9 5 - 25 mg/dL [facility]    Creatinine Level 0.77 0.57 - 1.25 mg/dL [facility]    GFR- >=60 mL/min/1.73mSq [facility]    GFR-ALEKSANDRA 91 >=60 mL/min/1.73mSq [facility]    Calcium Level 9.8 8.8 - 10.6 mg/dL [facility]    Protein Total 8.8 (H) 6.0 - 8.3 g/dL [facility]    Albumin Level 4.6 3.5 - 5.0 g/dL [facility]    Bilirubin Total 0.8 0.2 - 1.2 mg/dL [facility]    Alkaline Phosphatase Level 45 40 - 150 IU/L [facility]    SGOT (AST) 19 10 - 58 IU/L [facility]    SGPT (ALT) 12 5 - 50 IU/L [facility]    Anion Gap 13 8 - 16 mmol/L [facility]      Comprehensive metabolic panel (01/02/2020 8:35 PM CST)   Specimen   Blood - Vein     Comprehensive metabolic panel (01/02/2020 8:35 PM CST)   Narrative Performed At   MDRD Calculated GFR estimate resulted by System  based on Creatinine Level. Adjusted for gender and age. Results calculated in ml/min/1.73mSquared. Reference Range: >= 60 ml/min/1.73mSquared.  [facility]      Comprehensive metabolic panel (01/02/2020 8:35 PM CST)   Performing Organization Address City/State/Zipcode Phone Number   [facility]  [address]  [place] [address] [phone]    Back to top of Lab Results       CBC and differential (01/02/2020 8:35 PM CST)   CBC and differential (01/02/2020 8:35 PM CST)   Specimen   Blood - Vein     CBC and differential (01/02/2020 8:35 PM CST)   Narrative Performed At   The following orders were created for panel order CBC and differential.   Procedure Abnormality Status   --------- ----------- ------   CBC auto differential[[id number]] Abnormal Final result   Please view results for these tests on the individual orders.  [facility]      CBC and differential (01/02/2020 8:35 PM CST)   Performing Organization Address City/[facility] Phone Number   [facility]       Back to top of Lab Results       Norden Screen (01/02/2020 8:35 PM CST)   Norden Screen (01/02/2020 8:35 PM CST)   Component Value Ref Range Performed At Pathologist Signature   HIV Combo (Antigen/Antibody) Non-reactive Non-reactive [facility]      Norden Screen (01/02/2020 8:35 PM CST)   Specimen   Blood - Vein     Norden Screen (01/02/2020 8:35 PM CST)   Narrative Performed At   The HIV Ag/Ab Combo assay is a 4th generation HIV test capable of detecting both HIV p24 antigen and HIV 1/2 Antibodies. The assay may produce a positive result as early as 7-10 days after initial infection.   Assay performance has not been validated for children less than 2 years of age.   HIV-1 antigen and HIV-1/HIV-2 antibodies were not detected. No laboratory evidence of HIV infection. No further testing of the sample is indicated. If recent HIV exposure is suspected, redraw and  repeat.  [facility]      Mott Screen (01/02/2020 8:35 PM CST)   Performing Organization Address City/State/Zipcode Phone Number   [facility]  [address]  [place], [place] [id number] [phone]    Back to top of Lab Results   Visit Diagnoses  - documented in this encounter   Diagnosis   Behavior concern in adult    Substance-induced psychotic disorder (HCC)    History of bipolar disorder    Administered Medications  - documented in this encounter   Inactive Administered Medications - up to 3 most recent administrations   Inactive Administered Medications - up to 3 most recent administrations   Medication Order MAR Action Action Date Dose Rate Site   acetaminophen (TYLENOL) tablet 650 mg   650 mg, Oral, Every 6 hours PRN, Mild Pain (1-3), Starting Thu 1/2/20 at 2338, Until Fri 1/3/20 at 1308         benztropine (COGENTIN) tablet 1 mg   1 mg, Oral, Every 6 hours PRN, Tremors, EPS, Starting Thu 1/2/20 at 2339, Until Fri 1/3/20 at 1308         haloperidol (HALDOL) tablet 5 mg   5 mg, Oral, Every 6 hours PRN, Agitation, mild /moderate agitation, Starting Thu 1/2/20 at 2339, Until Fri 1/3/20 at 1308  Given 01/02/2020 11:45 PM CST 5 mg         haloperidol lactate (HALDOL) injection 5 mg   5 mg, Intramuscular, Every 6 hours PRN, Severe Agitation, Starting Thu 1/2/20 at 2339, Until Fri 1/3/20 at 1308         LORazepam (ATIVAN) injection 1 mg   1 mg, Intramuscular, Every 4 hours PRN, Anxiety, severe agitation and refusing/unable to take PO, Starting Thu 1/2/20 at 2339, Until Fri 1/3/20 at 1308         LORazepam (ATIVAN) tablet 1 mg   1 mg, Oral, Every 6 hours PRN, Anxiety, Starting Thu 1/2/20 at 2339, Until Fri 1/3/20 at 1308  Given 01/02/2020 11:45 PM CST 1 mg         nicotine (NICODERM CQ) 21 mg/24 hr 1 patch   1 patch, Transdermal, Daily as needed, nicotine replacement, Starting Thu 1/2/20 at 2339, Until Fri 1/3/20 at 1308         traZODone (DESYREL) tablet 50 mg   50 mg, Oral, Nightly  PRN, Sleep, insomnia, Starting Thu 1/2/20 at 2339, Until Fri 1/3/20 at 1308  Given 01/02/2020 11:45 PM CST 50 mg                                                                                                    Historical Medications  - added in this encounter   Reconcile with Patient's Chart   This list may reflect changes made after this encounter.  Medication Sig Dispensed Refills Start Date End Date   clonazePAM (KlonoPIN) 0.5 mg tablet Indications: Last filled 12/17/19 Take 0.5 mg by mouth every evening. Indications: Last filled 12/17/19  0  02/24/2020   FLUoxetine (PROZAC) 40 MG capsule Indications: Last filled 12/17/19 Take 40 mg by mouth daily. Indications: Last filled 12/17/19  0  01/09/2020   buPROPion XL (WELLBUTRIN XL) 150 mg 24 hr tablet Indications: Last filled 12/17/19 Take 150 mg by mouth daily. Indications: Last filled 12/17/19  0  01/09/2020   Active and Recently Administered Medications  - documented in this encounter   Legend   Legend   Given Not Given Canceled Entry Hold Due Other Actions    Time (Time) Time Time Time Time-Action      PRN   PRN   Medication Order 01/01/2020 01/02/2020 01/03/2020   acetaminophen (TYLENOL) tablet 650 mg  650 mg, Oral, Every 6 hours PRN, Mild Pain (1-3), Starting Thu 1/2/20 at 2338, Until Fri 1/3/20 at 1308               benztropine (COGENTIN) tablet 1 mg  1 mg, Oral, Every 6 hours PRN, Tremors, EPS, Starting Thu 1/2/20 at 2339, Until Fri 1/3/20 at 1308               haloperidol (HALDOL) tablet 5 mg  5 mg, Oral, Every 6 hours PRN, Agitation, mild /moderate agitation, Starting Thu 1/2/20 at 2339, Until Fri 1/3/20 at 1308       2345         haloperidol lactate (HALDOL) injection 5 mg  5 mg, Intramuscular, Every 6 hours PRN, Severe Agitation, Starting Thu 1/2/20 at 2339, Until Fri 1/3/20 at 1308               LORazepam (ATIVAN) injection 1 mg  1 mg, Intramuscular, Every 4 hours PRN, Anxiety, severe agitation and refusing/unable to take PO, Starting Thu 1/2/20 at  2339, Until Fri 1/3/20 at 1308               LORazepam (ATIVAN) tablet 1 mg  1 mg, Oral, Every 6 hours PRN, Anxiety, Starting Thu 1/2/20 at 2339, Until Fri 1/3/20 at 1308       2345         nicotine (NICODERM CQ) 21 mg/24 hr 1 patch  1 patch, Transdermal, Daily as needed, nicotine replacement, Starting Thu 1/2/20 at 2339, Until Fri 1/3/20 at 1308               traZODone (DESYREL) tablet 50 mg  50 mg, Oral, Nightly PRN, Sleep, insomnia, Starting Thu 1/2/20 at 2339, Until Fri 1/3/20 at 1308       2345         Orders  - documented in this encounter   Medications Ordered That Might Not Have Been Administered Count Last Ordered Date First Ordered Date   acetaminophen (TYLENOL) tablet 650 mg 1 01/02/2020    benztropine (COGENTIN) tablet 1 mg 1 01/02/2020    haloperidol lactate (HALDOL) injection 5 mg 1 01/02/2020    LORazepam (ATIVAN) injection 1 mg 1 01/02/2020    nicotine (NICODERM CQ) 21 mg/24 hr 1 patch 1 01/02/2020      Consult Count Last Ordered Date First Ordered Date   Inpatient Consult to [facility] 1 01/02/2020      Discharge Count Last Ordered Date First Ordered Date   DISCHARGE PATIENT 1 01/03/2020    Patient Demographics  Patient Address Communication Language Race / Ethnicity Marital Status   [address] [place] [place] [address] [phone] (Work)  [phone] (Home)  [phone] (Mobile)  [phone]  [email or web address]   English - Spoken (Preferred) White / Not  or  Single   Patient Contacts  Contact Name Contact Address Communication Relationship to Patient   Jayda Lenora [phone] (Home)   Mother, Emergency Contact   Wyatt Mathews Unknown [phone] (Home)   Friend, Emergency Contact   Document Information  Primary Care Provider Other Service Providers Document Coverage Dates   Not Known Pcp (Jul. 31, 2017July 31, 2017 - Present)  [address] [address]   Family Medicine  Jan. 02, 2020January 02, 2020 - Jan. 03, 2020January 03, 2020      Organization  "  [facility]  [address] [address]  [place] [address]     Encounter Providers Encounter Date    Jan. 02, 2020January 02, 2020 - Jan. 03, 2020January 03, 2020     Mother reported belief that patient didn't use drugs.   Was PEC'ed.       Reports moods modestly improved. Functioning ok in occupational and personal roles. No new health problems or meds. Adherent to medication, believes it's helping. Has been taking higher prozac dose throughout the month and finds this helpful, denies side effects. No therapy visits since last visit in May.     Background: 22 y/o F presents for establishment of care, reports intermittent treatment with medication since age 15. Describes episodic spells featuring overworry, simple things "get my brain stuck or fixated". Getting worked up over simple things. All of a sudden get really hot and hard to breathe, start crying. Problems functioning in personal and occupational roles - having difficulty completing work tasks due to problems with concentration (e.g. Losing track of counting at work). Takes meds but believes they're not working well. Says when not on meds has been unable to hold a job. "can't handle it". If forgets to take meds may have spell every 2-3 days. On meds consistently, spells happen every few weeks. After awhile felt concentration was getting worse even on meds and even though anxiety was mostly controlled. Added wellbutrin. Seems to have helped. Takes meds regularly. No side effects. Takes clonazepam - Started taking it once a day then more recently keeping them in reserve as a rescue med.     Psych Hx: from 15 to 18, in & out of psych hospital. About 6-7 times. First hospitalization in which she was paranoid, "thinking wasn't quite right". "dreamlike state of mind". First episode - synthetic MJ first time smoking it that day - "acting strange" [place]. "asked a reji " "for his keys to get home". "had this idea that I was dreaming and I couldn't wake up". Hospitalized at [facility] for about a week. "felt like my brain never was the same after that". Was prescribed antipsychotic, stopped it "because I didn't like the way it may me feel".     Concentration was a problem, "would feel lost in my own mind". Was hard to come to terms with how my brain changed. "got depressed a lot", "felt frustrated and helpless". Extremely unmotivated, self-critical for not being able to do things she thought she was able to do. "Didn't want to exist anymore". Had suicidal fantasies, "but I didn't think that I ever could". Has self-harmed by scratching/cutting self "because I was angry at myself". Would get kind of hysterical where I couldn't deal with my own emotions, crying and not able to talk to people without being overdramatic, mom encouraged hospitalization. "Also in kind of a delusional state", would get paranoid. Hadn't been using any drugs. [facility]. Stayed about 1 week. Thinks doctors called it "depression with anxiety". Meds started, perhaps wellbutrin + abilify. Felt like it helped but not fully.     "made a lot of progress" with Dr. Centeno at [facility]. Depression improved with prozac. Dr. Centeno, but didn't have rapport with the new person. Was no longer eligible for medicaid. Insurance/access of care led her to be off medication. Had more symptoms off medicine, couldn't keep a job. Later was able to get a decent job and continue it. Parents eventually got her on their insurance and she's been seeing Dr. Ramirez since. "haven't gotten paranoid in a long time", not since age 18 or 19.     Describes pattern of increased emotionality (some , decreased sleep (sometimes decreased need, sometimes "being too afraid to go to sleep"), and then hallucinations when sleep deprived. Laughing for no reasons, for weird reasons. "for awhile they were calling it " "'bipolar schizoaffective' (same diagnosis my grandmother had). Dr. Centeno tried other things. Last hospitalization was about 2 years ago.     7th, 8th grade - first time getting more paranoid. "thought people at school were talking about me and saying bad things about me".      MedHx: denies other health problems.   FamHx: grandmother - schizoaffective disorder (featuring paranoia, anxiety, AVH; multiple hospitalizations). Paternal great-grandfather "shell-shocked from the war")mom heard voices, had serious anxiety in 20's and 30's. "coped with it through reciting scriptures and saying comforting things to herself", "went away.     SocHx: from [place]. Grew up with both parents. Only child. No serious maltreatment. No developmental problems. Had problems with moods in context of being heavily criticized by teacher. "gained a lot of weight". Bullied. "used food to cope with things". Had to repeat 4th grade due to emotional problems, school avoidance. Then did home school next year but then failed leap test and mom realized she needed to go back to school. Started to lose weight in 5th grade. Obsessing about losing weight in middle school, around time started to have some paranoid thoughts. Was somewhat socially withdrawn, "not want to go anywhere". Always shy but "it went to another level". Didn't want to talk. "wanted to be invisible". Always thought I wasn't normal. "thought if I change the way I looked I could be normal". Started eating more again when running track, gained about 50 pounds. Was glad to notice people still wanted to be her friend. Mind wasn't same after psychotic episode with synthetic MJ - "sense of time was distorted", "felt like my mind wasn't grounded" (has gotten better over time). Was an "A/B student" before episode then some D's/F's after episode. "hard time understanding and concentrating" afterwards. Was pulled out of school and mom home-schooled her in 10th grade. Didn't have money to continue " it in 11th grade, but started late so was going to have to repeat the grade. Got GED.     Lives with mom and dad. Good relationships, supportive. 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 Likes structured environments. Full-time work.     Review Of Systems:     GENERAL:  No weight gain or loss  SKIN:  No rashes or lacerations  HEAD:  No headaches  EYES:  No exophthalmos, jaundice or blindness  EARS:  No dizziness, tinnitus or hearing loss  NOSE:  No changes in smell  MOUTH & THROAT:  No dyskinetic movements or obvious goiter  CHEST:  No shortness of breath, hyperventilation or cough  CARDIOVASCULAR:  No tachycardia or chest pain  ABDOMEN:  No nausea, vomiting, pain, constipation or diarrhea  URINARY:  No frequency, dysuria or sexual dysfunction  ENDOCRINE:  No polydipsia, polyuria  MUSCULOSKELETAL:  No pain or stiffness of the joints  NEUROLOGIC:  No weakness, sensory changes, seizures, confusion, memory loss, tremor or other abnormal movements    Current Evaluation:     Nutritional Screening: Considering the patient's height and weight, medications, medical history and preferences, should a referral be made to the dietitian? no    Constitutional  Vitals:  Most recent vital signs, dated less than 90 days prior to this appointment, were not reviewed.    Vitals:    03/02/20 1531   BP: (!) 130/92   Pulse: 82   Weight: 71.7 kg (158 lb 1.1 oz)        General:  unremarkable, age appropriate     Musculoskeletal  Muscle Strength/Tone:  no tremor, no tic   Gait & Station:  non-ataxic     Psychiatric  Appearance: casually dressed & groomed;   Behavior: calm, somewhat odd manner  Cooperation: cooperative with assessment  Speech: normal rate, volume, tone  Thought Process: linear, goal-directed  Thought Content: No suicidal or homicidal ideation; no delusions  Affect: anxious  Mood: anxious  Perceptions: No auditory or visual hallucinations  Level of Consciousness: alert throughout interview  Insight:  fair  Cognition: Oriented to person, place, time, & situation  Memory: no apparent deficits to general clinical interview; not formally assessed  Attention/Concentration: no apparent deficits to general clinical interview; not formally assessed  Fund of Knowledge: average by vocabulary/education    Laboratory Data  No visits with results within 1 Month(s) from this visit.   Latest known visit with results is:   Lab Visit on 02/27/2019   Component Date Value Ref Range Status    RPR 02/27/2019 Non-reactive  Non-reactive Final    HIV 1/2 Ag/Ab 02/27/2019 Negative  Negative Final    Hep B S Ab 02/27/2019 Positive*  Final    Hepatitis B Surface Ag 02/27/2019 Negative   Final    Hepatitis C Ab 02/27/2019 Negative   Final     Medications  Outpatient Encounter Medications as of 3/2/2020   Medication Sig Dispense Refill    buPROPion (WELLBUTRIN XL) 150 MG TB24 tablet Take 1 tablet (150 mg total) by mouth once daily. 30 tablet 3    clonazePAM (KLONOPIN) 0.5 MG tablet Take 1 tablet (0.5 mg total) by mouth every evening. 30 tablet 3    doxycycline (MONODOX) 100 MG capsule Take 1 capsule (100 mg total) by mouth every 12 (twelve) hours. 14 capsule 0    FLUoxetine 40 MG capsule Take 1 capsule (40 mg total) by mouth once daily. 30 capsule 3     No facility-administered encounter medications on file as of 3/2/2020.      Assessment - Diagnosis - Goals:     Impression: 23 y/o F with chronic anxiety which is fairly pervasive, as well as episodic periods with more severe symptoms, depressive episodes, and cognitive symptoms, particularly following k2 use. Psychosis less prominent in recent years. Anxiety reduced with current treatment. Has had premenstrual pattern of dysphoria. 2nd episode of psychosis following drug use, this time LSD. Symptoms have persisted with paranoia waning somewhat, dysphoria not improving.     Dx: bipolar disorder.     Treatment Goals:  Specify outcomes written in observable, behavioral terms: reduce  anxiety.     Treatment Plan/Recommendations:     · Fluoxetine 40 mg daily, start abilify 15 mg daily, discontinue. Clonazepam prn.  · To financial counselor.   · resume psychotherapy.   · Discussed risks, benefits, and alternatives to treatment plan documented above with patient. I answered all patient questions related to this plan and patient expressed understanding and agreement.     Return to Clinic: 2 months    Counseling time: 10 minutes  Total time: 25 minutes    KENNEDY Campos MD  Psychiatry  [facility]  [address] , [place] [address] [phone]

## 2020-07-08 ENCOUNTER — LAB VISIT (OUTPATIENT)
Dept: PRIMARY CARE CLINIC | Facility: OTHER | Age: 25
End: 2020-07-08
Attending: INTERNAL MEDICINE

## 2020-07-08 DIAGNOSIS — Z03.818 ENCOUNTER FOR OBSERVATION FOR SUSPECTED EXPOSURE TO OTHER BIOLOGICAL AGENTS RULED OUT: ICD-10-CM

## 2020-07-08 PROCEDURE — U0003 INFECTIOUS AGENT DETECTION BY NUCLEIC ACID (DNA OR RNA); SEVERE ACUTE RESPIRATORY SYNDROME CORONAVIRUS 2 (SARS-COV-2) (CORONAVIRUS DISEASE [COVID-19]), AMPLIFIED PROBE TECHNIQUE, MAKING USE OF HIGH THROUGHPUT TECHNOLOGIES AS DESCRIBED BY CMS-2020-01-R: HCPCS

## 2020-07-11 LAB — SARS-COV-2 RNA RESP QL NAA+PROBE: NEGATIVE

## 2020-10-06 ENCOUNTER — PATIENT MESSAGE (OUTPATIENT)
Dept: ADMINISTRATIVE | Facility: HOSPITAL | Age: 25
End: 2020-10-06

## 2020-12-28 ENCOUNTER — OFFICE VISIT (OUTPATIENT)
Dept: OBSTETRICS AND GYNECOLOGY | Facility: CLINIC | Age: 25
End: 2020-12-28
Payer: COMMERCIAL

## 2020-12-28 VITALS
HEIGHT: 67 IN | BODY MASS INDEX: 24.63 KG/M2 | SYSTOLIC BLOOD PRESSURE: 118 MMHG | WEIGHT: 156.94 LBS | DIASTOLIC BLOOD PRESSURE: 84 MMHG

## 2020-12-28 DIAGNOSIS — Z30.09 ENCOUNTER FOR COUNSELING REGARDING CONTRACEPTION: ICD-10-CM

## 2020-12-28 DIAGNOSIS — R30.0 DYSURIA: ICD-10-CM

## 2020-12-28 DIAGNOSIS — N89.8 VAGINAL DISCHARGE: ICD-10-CM

## 2020-12-28 DIAGNOSIS — B37.31 VULVOVAGINAL CANDIDIASIS: Primary | ICD-10-CM

## 2020-12-28 DIAGNOSIS — Z30.41 ENCOUNTER FOR SURVEILLANCE OF CONTRACEPTIVE PILLS: ICD-10-CM

## 2020-12-28 DIAGNOSIS — B96.89 BACTERIAL VAGINOSIS: ICD-10-CM

## 2020-12-28 DIAGNOSIS — N76.0 BACTERIAL VAGINOSIS: ICD-10-CM

## 2020-12-28 PROCEDURE — 87070 CULTURE OTHR SPECIMN AEROBIC: CPT

## 2020-12-28 PROCEDURE — 87147 CULTURE TYPE IMMUNOLOGIC: CPT

## 2020-12-28 PROCEDURE — 99213 PR OFFICE/OUTPT VISIT, EST, LEVL III, 20-29 MIN: ICD-10-PCS | Mod: 25,S$GLB,, | Performed by: NURSE PRACTITIONER

## 2020-12-28 PROCEDURE — 99999 PR PBB SHADOW E&M-EST. PATIENT-LVL III: CPT | Mod: PBBFAC,,, | Performed by: NURSE PRACTITIONER

## 2020-12-28 PROCEDURE — 99213 OFFICE O/P EST LOW 20 MIN: CPT | Mod: 25,S$GLB,, | Performed by: NURSE PRACTITIONER

## 2020-12-28 PROCEDURE — 81002 URINALYSIS NONAUTO W/O SCOPE: CPT | Mod: S$GLB,,, | Performed by: NURSE PRACTITIONER

## 2020-12-28 PROCEDURE — 81002 PR URINALYSIS NONAUTO W/O SCOPE: ICD-10-PCS | Mod: S$GLB,,, | Performed by: NURSE PRACTITIONER

## 2020-12-28 PROCEDURE — 87210 PR  SMEAR,STAIN,WET MNT,INTERP: ICD-10-PCS | Mod: QW,S$GLB,, | Performed by: NURSE PRACTITIONER

## 2020-12-28 PROCEDURE — 99999 PR PBB SHADOW E&M-EST. PATIENT-LVL III: ICD-10-PCS | Mod: PBBFAC,,, | Performed by: NURSE PRACTITIONER

## 2020-12-28 PROCEDURE — 87210 SMEAR WET MOUNT SALINE/INK: CPT | Mod: QW,S$GLB,, | Performed by: NURSE PRACTITIONER

## 2020-12-28 RX ORDER — METRONIDAZOLE 500 MG/1
500 TABLET ORAL 2 TIMES DAILY
Qty: 14 TABLET | Refills: 0 | Status: SHIPPED | OUTPATIENT
Start: 2020-12-28 | End: 2021-01-04

## 2020-12-28 RX ORDER — NYSTATIN AND TRIAMCINOLONE ACETONIDE 100000; 1 [USP'U]/G; MG/G
CREAM TOPICAL
Qty: 30 G | Refills: 1 | Status: SHIPPED | OUTPATIENT
Start: 2020-12-28 | End: 2021-07-16

## 2020-12-28 RX ORDER — NORETHINDRONE ACETATE AND ETHINYL ESTRADIOL .02; 1 MG/1; MG/1
1 TABLET ORAL DAILY
Qty: 28 TABLET | Refills: 12 | Status: SHIPPED | OUTPATIENT
Start: 2020-12-28

## 2020-12-28 RX ORDER — FLUCONAZOLE 150 MG/1
150 TABLET ORAL
Qty: 2 TABLET | Refills: 0 | Status: SHIPPED | OUTPATIENT
Start: 2020-12-28 | End: 2021-01-01

## 2020-12-28 NOTE — PATIENT INSTRUCTIONS
Bacterial Vaginosis    You have a vaginal infection called bacterial vaginosis (BV). Both good and bad bacteria are present in a healthy vagina. BV occurs when these bacteria get out of balance. The number of bad bacteria increase. And the number of good bacteria decrease.  BV may or may not cause symptoms. If symptoms do occur, they can include:  · Thin, gray, milky-white, or sometimes green discharge  · Unpleasant odor or fishy smell  · Itching, burning, or pain in or around the vagina  It is not known what causes BV, but certain factors can make the problem more likely. This can include:  · Douching  · Having sex with a new partner  · Having sex with more than one partner  BV will sometimes go away on its own. But treatment is usually recommended. This is because untreated BV can increase the risk of more serious health problems such as:  · Pelvic inflammatory disease (PID)  ·  delivery (giving birth to a baby early if youre pregnant)  · HIV and certain other sexually transmitted diseases (STDs)  · Infection after surgery on the reproductive organs  Home care  General care  · BV is most often treated with medicines called antibiotics. These may be given as pills or as a vaginal cream. If antibiotics are prescribed, be sure to use them exactly as directed. Also, be sure to complete all of the medicine, even if your symptoms go away.  · Avoid douching or having sex during treatment.  · If you have sex with a female partner, ask your healthcare provider if she should also be treated.  Prevention  · Limit or avoid douching.  · Avoid having sex. If you do have sex, then take steps to lower your risk:  ¨ Use condoms when having sex.  ¨ Limit the number of partners you have sex with.  Follow-up care  Follow up with your healthcare provider, or as advised.  When to seek medical advice  Call your healthcare provider right away if:  · You have a fever of 100.4ºF (38ºC) or higher, or as directed by your  provider.  · Your symptoms worsen, or they dont go away within a few days of starting treatment.  · You have new pain in the lower belly or pelvic region.  · You have side effects that bother you or a reaction to the pills or cream youre prescribed.  · You or any partners you have sex with have new symptoms, such as a rash, joint pain, or sores.  Date Last Reviewed: 7/30/2015 © 2000-2017 [employer]. [address] [place] [address]. All rights reserved. This information is not intended as a substitute for professional medical care. Always follow your healthcare professional's instructions.        Birth Control Methods  Birth control methods are used to help prevent pregnancy. There are many different methods to choose from. Talk to your healthcare provider about which method is right for you. Be sure to ask your provider about the effectiveness of each method. Also ask about the benefits, risks, and side effects of each method.  Hormones  Some birth control methods work by releasing hormones such as progestin and estrogen. These methods include: hormone implants, hormone shots, the vaginal ring, the patch, and birth control pills. They all work by stopping ovulation (release of the egg from the ovary). The implant is a small device that needs to be placed in the upper arm by a trained healthcare provider. It works for up to 3 years. Hormone injections must be repeated every 3 months. The vaginal ring must be replaced monthly (it can be removed during the fourth week of each cycle). The patch must be replaced weekly (it is not worn during the fourth week of each cycle). Birth control pills must be taken every day. Note that all of these methods are effective and can be stopped at any time.  Intrauterine Device (IUD)  An IUD is a small, T-shaped device. It must be placed in the uterus by a trained healthcare provider. There are different types of IUDs available. They work by causing changes  in the uterus that make it harder for sperm to reach the egg. Depending on the type of IUD you have, it may work for several years or longer. The IUD is a reversible birth control method. This means it can be removed at any time.  Condom  A condom is a sheath that forms a thin barrier between the penis and the vagina. It helps prevent pregnancy by keeping sperm from entering the vagina. When latex condoms are used, they have the added benefit of protecting against most STDs (sexually transmitted diseases). Condoms should be discarded after each use. Ask your healthcare provider about the different types of condoms available. These include both the male condom and female condom.  Spermicide  Spermicides come as foams, jellies, creams, suppositories, and tablets.  They help prevent pregnancy by killing sperm. When used alone they are not that reliable. They work best when combined with other birth control methods such as diaphragms and cervical caps.  Sponge, Diaphragm, and Cervical Cap  All of these methods help prevent pregnancy by covering the opening of the uterus (cervix). This prevents sperm from passing through.  The sponge contains spermicide. It can be bought over the counter. The sponge must be left in place for at least 6 hours after the last time you have sex. However, it should not stay in place for more than 24 hours. It should be discarded after it is used.  The diaphragm and cervical cap must be fitted and prescribed by your healthcare provider. Both are used with spermicide. The diaphragm must be left in place for at least 6 hours after sex. However, it should not stay in place for more than 24 hours. It can be washed and reused. The cervical cap must be left in place for at least 6 hours after sex. However, it should not stay in place for more than 48 hours. It can be washed and reused.  Withdrawal Method  This is when the man pulls his penis out of the vagina just before ejaculation (coming). This  lowers the amount of sperm entering the vagina. Be aware that fluids released just before ejaculation often still contain some sperm, so this method is not as reliable as certain other methods.  Rhythm Method  This method requires that you know when in your menstrual cycle you are likely to become pregnant. Then, you avoid sex during those days. This requires careful planning and good discipline. Your healthcare provider can explain more about how this works.  Tubal Ligation and Vasectomy  These are surgical methods to prevent pregnancy. Tubal ligation is an option for women. The fallopian tubes are blocked or cut (ligated). This keeps the egg from passing into the uterus or sperm from reaching the egg. Vasectomy is an option for men. The tubes that normally carry sperm to the penis are either closed or blocked. Both tubal ligation and vasectomy are permanent both control methods. This means reversal is either not possible or unlikely to work. They are good choices for women and men who know that they do not want to have children in the future.  Date Last Reviewed: 6/11/2015  © 2312-7518 [employer]. [address] [place] [address]. All rights reserved. This information is not intended as a substitute for professional medical care. Always follow your healthcare professional's instructions.        Preventing Vaginal Infection  These steps can help you stay comfortable during treatment of a vaginal infection. They also help prevent vaginal infections in the future.  Keeping a healthy balance  Factors that change the normal balance in the vagina can lead to a vaginal infection. To help keep the balance normal, try these tips:  · Change out of wet bathing suits and damp exercise clothes as soon as possible. Yeast thrive in a warm, moist environment.  · Avoid wearing tight pants. Choose cotton underwear and pantyhose that have a cotton crotch. Cotton keeps you cooler and drier than  synthetics.  · Don't douche unless directed by your health care provider. Douching can destroy friendly bacteria and change the vagina's normal balance.  · Wipe from front to back after using the toilet. This prevents bacteria from spreading from the anus to the vulva.  · Wash the vulva with mild, unscented soap or with plain water.  · Wash your diaphragm, spermicide applicators, and sex toys with mild soap and water after use. Dry them thoroughly before putting them away.  · Change tampons often (every 2 hours to 4 hours). Leaving a tampon in for too long may disrupt the balance of vaginal bacteria.  · Avoid vaginal sprays, scented toilet paper and soaps, and deodorant tampons or pads, which can cause vaginal irritation  Staying healthy overall  Good overall health can help you resist infection. To be healthier:  · Help protect yourself from STDs by using latex condoms for intercourse. Ask your health care provider for more information about safer sex.  · Eat a variety of healthy foods.  · Exercise regularly.  · Get enough rest and sleep.  · Maintain a healthy weight. If you need to lose weight, ask your health care provider for advice on how to start.  Date Last Reviewed: 5/18/2015  © 4565-3819 [employer]. [address] [place] [address], [place] [address]. All rights reserved. This information is not intended as a substitute for professional medical care. Always follow your healthcare professional's instructions.        Vaginal Infection: Yeast (Candidiasis)  Yeast infection occurs when yeast in the vagina increase and attacks the vaginal tissues. Yeast is a type of fungus. These infections are often caused by a type of yeast called Candida albicans. Other species of yeast can also cause infections. Factors that may make infection more likely include recent antibiotic use, douching, or increased sex. Yeast infections are more common in women who have diabetes, or are obese or pregnant, or have a weak immune  system.  Symptoms of yeast infection  · Clumpy or thin, white discharge, which may look like cottage cheese  · No odor or minimal odor  · Severe vaginal itching or burning  · Burning with urination  · Swelling, redness of vulva  · Pain during sex  Treating yeast infection  Yeast infection is treated with a vaginal antifungal cream. In some cases, antifungal pills are prescribed instead. During treatment:  · Finish all of your medicine, even if your symptoms go away.  · Apply the cream before going to bed. Lie flat after applying so that it doesn't drip out.  · Do not douche or use tampons.  · Don't rely on a diaphragm or condoms, since the cream may weaken them.  · Avoid intercourse if advised by your healthcare provider.     Should I treat a yeast infection myself?  Discuss with your healthcare provider whether you should use over-the-counter medicines to treat a yeast infection. Self-treatment may depend on whether:  · You've had a yeast infection in the past.  · You're at risk for STDs.  Call your healthcare provider if symptoms do not go away or come back after treatment.   Date Last Reviewed: 3/1/2017  © 1735-2865 [employer]. [address] [place] [address]. All rights reserved. This information is not intended as a substitute for professional medical care. Always follow your healthcare professional's instructions.        Vaginal Infection: Understanding the Vaginal Environment  The vagina is a canal. It connects the uterus (womb) to the outside of the body. It is home to many types of bacteria and other tiny organisms. These different bacteria most often stay balanced in number. This keeps the vagina healthy. If the balance changes, it can cause infection.   A healthy environment  Many types of bacteria are present in a healthy vagina. When balanced, they dont cause problems. Small amounts of yeast may also be present without causing problems. The most common type of bacteria in the  vagina is lactobacillus. It helps keep the vagina at a low pH. A low pH keeps bad bacteria from taking over.  Normal vaginal discharge  The vagina makes fluid. It is sent out as discharge. This also keeps the vagina healthy. Normal discharge can be clear, white, or yellowish. Most women find that normal discharge varies in amount and color through the month.  An unhealthy environment  The vaginal environment may get out of balance. This may result in a vaginal infection. There are a few reasons this can happen. The pH may have changed. The amount of one organism, such as yeast, may increase. Or an outside organism may get into the vagina and throw off the balance:  · Bacterial vaginosis (BV). BV is due to an imbalance in the normal bacteria in the vagina. Lactobacillus bacteria decrease. As a result, the numbers of bad bacteria increase.  · Candidiasis (yeast infection). Yeast is a type of fungus. A yeast infection occurs when yeast cells in the vagina increase. They then attack vaginal tissues. A type of yeast called Candida albicans is often involved.  · Trichomoniasis (trich). Trich is a parasite. It is passed from one person to another during sex. Men with trich often dont have any symptoms. In women, it can take weeks or months before symptoms appear.  Date Last Reviewed: 3/1/2017  © 4526-0338 [employer]. [address] [place] [address], [place] [address]. All rights reserved. This information is not intended as a substitute for professional medical care. Always follow your healthcare professional's instructions.

## 2020-12-28 NOTE — PROGRESS NOTES
"CC: Vaginal discharge    Alea Cooley is a 25 y.o. female  presents with complaint of vaginal discharge for 1 week.  She reports itching.  reports odor.  She states the discharge is yellow, white, creamy and curd-like.  Reports pain with intercourse, mainly at the introitus. Patient would also like contraception counseling. Reports being sexually active with the same partner for last 2 years, reports condom use.     Past Medical History:   Diagnosis Date    Anxiety and depression      History reviewed. No pertinent surgical history.  Social History     Tobacco Use    Smoking status: Never Smoker    Smokeless tobacco: Never Used   Substance Use Topics    Alcohol use: Yes     Comment: occ    Drug use: No     Family History   Problem Relation Age of Onset    Diabetes Paternal Grandfather     Diabetes Paternal Grandmother     Diabetes Maternal Grandmother      OB History    Para Term  AB Living   0 0 0 0 0 0   SAB TAB Ectopic Multiple Live Births   0 0 0 0 0       /84   Ht 5' 7" (1.702 m)   Wt 71.2 kg (156 lb 15.5 oz)   LMP 2020   BMI 24.58 kg/m²     ROS:  GENERAL: No fever, chills, fatigability or weight loss.  PELVIC: See HPI  ABDOMEN: No abdominal pain. Denies nausea. Denies vomiting. No diarrhea. No constipation  BREAST: Denies pain. No lumps. No discharge.  URINARY: No incontinence, no nocturia, no frequency and no dysuria.  CARDIOVASCULAR: No chest pain. No shortness of breath. No leg cramps.  NEUROLOGICAL: No headaches. No vision changes.    PHYSICAL EXAM:  VULVA: vulvar erythema present,   VAGINA: vaginal erythema present, vaginal discharge - moderate amount of menstrual blood noted in vault  CERVIX: normal appearing cervix without discharge or lesions,   UTERUS: uterus is normal size, shape, consistency and nontender,   ADNEXA: normal adnexa in size, nontender and no masses,     WET MOUNT done - results: mostly RBCs due to menses, few clue cells, few hyphae  Urine " dipstick shows positive for RBC's (likely due to heavy menses)      ASSESSMENT and PLAN:  Alea was seen today for vaginal discharge.    Diagnoses and all orders for this visit:    Vaginal discharge  -     POCT WET PREP  -     Genital Culture    Dysuria  -     POCT urine dipstick without microscope    Encounter for surveillance of contraceptive pills  -     norethindrone-ethinyl estradiol (LOESTRIN 1/20, 21,) 1-20 mg-mcg per tablet; Take 1 tablet by mouth once daily.    Encounter for counseling regarding contraception  -     norethindrone-ethinyl estradiol (LOESTRIN 1/20, 21,) 1-20 mg-mcg per tablet; Take 1 tablet by mouth once daily.  -     Discussed contraception options with patient including pills, patch, ring, Depo Provera, Nexplanon and IUDs. Patient desires OCPs  -     The use of the oral contraceptive has been fully discussed with the patient. This includes the proper method to initiate and continue the pills, the need for regular compliance to ensure adequate contraceptive effect, the physiology which make the pill effective, the instructions for what to do in event of a missed pill, and warnings about anticipated minor side effects such as breakthrough spotting, nausea, breast tenderness, weight changes, acne, headaches, etc.  She has been told of the more serious potential side effects such as MI, stroke, and deep vein thrombosis, all of which are very unlikely.  She has been asked to report any signs of such serious problems immediately.  She should back up the pill with a condom during any cycle in which antibiotics are prescribed, and during the first cycle as well. The need for additional protection, such as a condom, to prevent exposure to sexually transmitted diseases has also been discussed- the patient has been clearly reminded that OCP's cannot protect her against diseases such as HIV and others. She understands and wishes to take the medication as prescribed.    Vulvovaginal candidiasis  -      fluconazole (DIFLUCAN) 150 MG Tab; Take 1 tablet (150 mg total) by mouth every 72 hours. for 2 doses  -     nystatin-triamcinolone (MYCOLOG II) cream; Apply to affected area 2 times daily    Bacterial vaginosis  -     metroNIDAZOLE (FLAGYL) 500 MG tablet; Take 1 tablet (500 mg total) by mouth 2 (two) times daily. for 7 days  -     Genital Culture --sent  -     Avoid alcohol while taking and for 24 hours after.  -     Avoid sexual intercourse until symptoms have resolved and treatment complete.    Exam and wet mount limited due to heavy menstruation. Will treat empirically.     Patient was counseled today on vaginitis prevention including :  a. avoiding feminine products such as deoderant soaps, body wash, bubble bath, douches, scented toilet paper, deoderant tampons or pads, feminine wipes, chronic pad use, etc.  b. avoiding other vulvovaginal irritants such as long hot baths, humidity, tight, synthetic clothing, chlorine and sitting around in wet bathing suits  c. wearing cotton underwear, avoiding thong underwear and no underwear to bed  d. taking showers instead of baths and use a hair dryer on cool setting afterwards to dry  e. wearing cotton to exercise and shower immediately after exercise and change clothes  f. using polyurethane condoms without spermicide if sexually active and symptoms are triggered by intercourse    Follow up with me as needed.

## 2020-12-31 ENCOUNTER — TELEPHONE (OUTPATIENT)
Dept: OBSTETRICS AND GYNECOLOGY | Facility: CLINIC | Age: 25
End: 2020-12-31

## 2020-12-31 DIAGNOSIS — A49.1 GROUP B STREPTOCOCCAL INFECTION: Primary | ICD-10-CM

## 2020-12-31 LAB — BACTERIA GENITAL AEROBE CULT: ABNORMAL

## 2020-12-31 RX ORDER — AMOXICILLIN 875 MG/1
875 TABLET, FILM COATED ORAL EVERY 12 HOURS
Qty: 14 TABLET | Refills: 0 | Status: SHIPPED | OUTPATIENT
Start: 2020-12-31 | End: 2021-01-07

## 2021-02-10 ENCOUNTER — OFFICE VISIT (OUTPATIENT)
Dept: PSYCHIATRY | Facility: CLINIC | Age: 26
End: 2021-02-10
Payer: COMMERCIAL

## 2021-02-10 VITALS
BODY MASS INDEX: 24.24 KG/M2 | DIASTOLIC BLOOD PRESSURE: 75 MMHG | WEIGHT: 154.75 LBS | HEART RATE: 60 BPM | SYSTOLIC BLOOD PRESSURE: 111 MMHG

## 2021-02-10 DIAGNOSIS — F31.30 BIPOLAR AFFECTIVE DISORDER, CURRENT EPISODE DEPRESSED, CURRENT EPISODE SEVERITY UNSPECIFIED: Primary | ICD-10-CM

## 2021-02-10 PROCEDURE — 99999 PR PBB SHADOW E&M-EST. PATIENT-LVL II: ICD-10-PCS | Mod: PBBFAC,,, | Performed by: PSYCHIATRY & NEUROLOGY

## 2021-02-10 PROCEDURE — 99212 OFFICE O/P EST SF 10 MIN: CPT | Mod: PBBFAC | Performed by: PSYCHIATRY & NEUROLOGY

## 2021-02-10 PROCEDURE — 99999 PR PBB SHADOW E&M-EST. PATIENT-LVL II: CPT | Mod: PBBFAC,,, | Performed by: PSYCHIATRY & NEUROLOGY

## 2021-02-10 PROCEDURE — 99214 OFFICE O/P EST MOD 30 MIN: CPT | Mod: S$PBB,,, | Performed by: PSYCHIATRY & NEUROLOGY

## 2021-02-10 PROCEDURE — 99214 PR OFFICE/OUTPT VISIT, EST, LEVL IV, 30-39 MIN: ICD-10-PCS | Mod: S$PBB,,, | Performed by: PSYCHIATRY & NEUROLOGY

## 2021-02-10 RX ORDER — ARIPIPRAZOLE 10 MG/1
10 TABLET ORAL DAILY
Qty: 30 TABLET | Refills: 2 | Status: SHIPPED | OUTPATIENT
Start: 2021-02-10 | End: 2021-06-16

## 2021-02-10 RX ORDER — FLUOXETINE HYDROCHLORIDE 40 MG/1
40 CAPSULE ORAL DAILY
Qty: 30 CAPSULE | Refills: 2 | Status: SHIPPED | OUTPATIENT
Start: 2021-02-10 | End: 2021-05-20

## 2021-03-24 ENCOUNTER — IMMUNIZATION (OUTPATIENT)
Dept: INTERNAL MEDICINE | Facility: CLINIC | Age: 26
End: 2021-03-24
Payer: COMMERCIAL

## 2021-03-24 DIAGNOSIS — Z23 NEED FOR VACCINATION: Primary | ICD-10-CM

## 2021-03-24 PROCEDURE — 91300 COVID-19, MRNA, LNP-S, PF, 30 MCG/0.3 ML DOSE VACCINE: CPT | Mod: PBBFAC | Performed by: FAMILY MEDICINE

## 2021-03-25 ENCOUNTER — PATIENT MESSAGE (OUTPATIENT)
Dept: ADMINISTRATIVE | Facility: HOSPITAL | Age: 26
End: 2021-03-25

## 2021-04-14 ENCOUNTER — IMMUNIZATION (OUTPATIENT)
Dept: INTERNAL MEDICINE | Facility: CLINIC | Age: 26
End: 2021-04-14
Payer: COMMERCIAL

## 2021-04-14 DIAGNOSIS — Z23 NEED FOR VACCINATION: Primary | ICD-10-CM

## 2021-04-14 PROCEDURE — 0002A COVID-19, MRNA, LNP-S, PF, 30 MCG/0.3 ML DOSE VACCINE: CPT | Mod: CV19,,, | Performed by: FAMILY MEDICINE

## 2021-04-14 PROCEDURE — 91300 COVID-19, MRNA, LNP-S, PF, 30 MCG/0.3 ML DOSE VACCINE: ICD-10-PCS | Mod: ,,, | Performed by: FAMILY MEDICINE

## 2021-04-14 PROCEDURE — 91300 COVID-19, MRNA, LNP-S, PF, 30 MCG/0.3 ML DOSE VACCINE: CPT | Mod: ,,, | Performed by: FAMILY MEDICINE

## 2021-04-14 PROCEDURE — 0002A COVID-19, MRNA, LNP-S, PF, 30 MCG/0.3 ML DOSE VACCINE: ICD-10-PCS | Mod: CV19,,, | Performed by: FAMILY MEDICINE

## 2021-06-16 ENCOUNTER — OFFICE VISIT (OUTPATIENT)
Dept: PSYCHIATRY | Facility: CLINIC | Age: 26
End: 2021-06-16
Payer: COMMERCIAL

## 2021-06-16 DIAGNOSIS — F31.30 BIPOLAR AFFECTIVE DISORDER, CURRENT EPISODE DEPRESSED, CURRENT EPISODE SEVERITY UNSPECIFIED: Primary | ICD-10-CM

## 2021-06-16 PROCEDURE — 99214 OFFICE O/P EST MOD 30 MIN: CPT | Mod: 95,,, | Performed by: PSYCHIATRY & NEUROLOGY

## 2021-06-16 PROCEDURE — 99214 PR OFFICE/OUTPT VISIT, EST, LEVL IV, 30-39 MIN: ICD-10-PCS | Mod: 95,,, | Performed by: PSYCHIATRY & NEUROLOGY

## 2021-06-16 RX ORDER — LAMOTRIGINE 25 MG/1
TABLET ORAL
Qty: 84 TABLET | Refills: 0 | Status: SHIPPED | OUTPATIENT
Start: 2021-06-16 | End: 2021-07-26

## 2021-06-16 RX ORDER — FLUOXETINE HYDROCHLORIDE 40 MG/1
40 CAPSULE ORAL DAILY
Qty: 30 CAPSULE | Refills: 2 | Status: SHIPPED | OUTPATIENT
Start: 2021-06-16 | End: 2021-10-19 | Stop reason: SDUPTHER

## 2021-06-16 RX ORDER — LAMOTRIGINE 100 MG/1
100 TABLET ORAL DAILY
Qty: 30 TABLET | Refills: 2 | Status: SHIPPED | OUTPATIENT
Start: 2021-07-27 | End: 2021-07-26

## 2021-07-13 ENCOUNTER — PATIENT MESSAGE (OUTPATIENT)
Dept: PSYCHIATRY | Facility: CLINIC | Age: 26
End: 2021-07-13

## 2021-07-14 RX ORDER — QUETIAPINE FUMARATE 100 MG/1
TABLET, FILM COATED ORAL
Qty: 30 TABLET | Refills: 2 | Status: SHIPPED | OUTPATIENT
Start: 2021-07-14 | End: 2021-10-19 | Stop reason: SDUPTHER

## 2021-07-21 ENCOUNTER — OFFICE VISIT (OUTPATIENT)
Dept: INTERNAL MEDICINE | Facility: CLINIC | Age: 26
End: 2021-07-21
Payer: COMMERCIAL

## 2021-07-21 VITALS
HEART RATE: 108 BPM | DIASTOLIC BLOOD PRESSURE: 70 MMHG | BODY MASS INDEX: 24.9 KG/M2 | TEMPERATURE: 100 F | RESPIRATION RATE: 16 BRPM | OXYGEN SATURATION: 98 % | SYSTOLIC BLOOD PRESSURE: 98 MMHG | WEIGHT: 158.94 LBS

## 2021-07-21 DIAGNOSIS — H66.003 ACUTE SUPPURATIVE OTITIS MEDIA OF BOTH EARS WITHOUT SPONTANEOUS RUPTURE OF TYMPANIC MEMBRANES, RECURRENCE NOT SPECIFIED: Primary | ICD-10-CM

## 2021-07-21 DIAGNOSIS — B34.9 VIRAL SYNDROME: ICD-10-CM

## 2021-07-21 LAB
GROUP A STREP, MOLECULAR: NEGATIVE
INFLUENZA A, MOLECULAR: NEGATIVE
INFLUENZA B, MOLECULAR: NEGATIVE
SPECIMEN SOURCE: NORMAL

## 2021-07-21 PROCEDURE — 87651 STREP A DNA AMP PROBE: CPT | Performed by: NURSE PRACTITIONER

## 2021-07-21 PROCEDURE — 99214 OFFICE O/P EST MOD 30 MIN: CPT | Mod: S$GLB,,, | Performed by: NURSE PRACTITIONER

## 2021-07-21 PROCEDURE — 99999 PR PBB SHADOW E&M-EST. PATIENT-LVL III: CPT | Mod: PBBFAC,,, | Performed by: NURSE PRACTITIONER

## 2021-07-21 PROCEDURE — 99214 PR OFFICE/OUTPT VISIT, EST, LEVL IV, 30-39 MIN: ICD-10-PCS | Mod: S$GLB,,, | Performed by: NURSE PRACTITIONER

## 2021-07-21 PROCEDURE — 87502 INFLUENZA DNA AMP PROBE: CPT | Performed by: NURSE PRACTITIONER

## 2021-07-21 PROCEDURE — 99999 PR PBB SHADOW E&M-EST. PATIENT-LVL III: ICD-10-PCS | Mod: PBBFAC,,, | Performed by: NURSE PRACTITIONER

## 2021-07-21 RX ORDER — AMOXICILLIN 875 MG/1
875 TABLET, FILM COATED ORAL EVERY 12 HOURS
Qty: 14 TABLET | Refills: 0 | Status: SHIPPED | OUTPATIENT
Start: 2021-07-21 | End: 2021-07-28

## 2021-07-22 ENCOUNTER — PATIENT MESSAGE (OUTPATIENT)
Dept: INTERNAL MEDICINE | Facility: CLINIC | Age: 26
End: 2021-07-22

## 2021-07-23 ENCOUNTER — OFFICE VISIT (OUTPATIENT)
Dept: OTOLARYNGOLOGY | Facility: CLINIC | Age: 26
End: 2021-07-23
Payer: COMMERCIAL

## 2021-07-23 VITALS
SYSTOLIC BLOOD PRESSURE: 109 MMHG | BODY MASS INDEX: 24.41 KG/M2 | DIASTOLIC BLOOD PRESSURE: 80 MMHG | HEART RATE: 70 BPM | TEMPERATURE: 98 F | WEIGHT: 155.88 LBS

## 2021-07-23 DIAGNOSIS — H92.03 OTALGIA OF BOTH EARS: ICD-10-CM

## 2021-07-23 DIAGNOSIS — K12.1 ULCER OF SOFT PALATE: Primary | ICD-10-CM

## 2021-07-23 DIAGNOSIS — J30.9 ALLERGIC RHINITIS, UNSPECIFIED SEASONALITY, UNSPECIFIED TRIGGER: ICD-10-CM

## 2021-07-23 PROCEDURE — 99203 PR OFFICE/OUTPT VISIT, NEW, LEVL III, 30-44 MIN: ICD-10-PCS | Mod: S$GLB,,, | Performed by: PHYSICIAN ASSISTANT

## 2021-07-23 PROCEDURE — 99203 OFFICE O/P NEW LOW 30 MIN: CPT | Mod: S$GLB,,, | Performed by: PHYSICIAN ASSISTANT

## 2021-07-23 PROCEDURE — 99999 PR PBB SHADOW E&M-EST. PATIENT-LVL III: CPT | Mod: PBBFAC,,, | Performed by: PHYSICIAN ASSISTANT

## 2021-07-23 PROCEDURE — 99999 PR PBB SHADOW E&M-EST. PATIENT-LVL III: ICD-10-PCS | Mod: PBBFAC,,, | Performed by: PHYSICIAN ASSISTANT

## 2021-07-23 RX ORDER — METHYLPREDNISOLONE 4 MG/1
TABLET ORAL
Qty: 1 PACKAGE | Refills: 0 | Status: SHIPPED | OUTPATIENT
Start: 2021-07-23 | End: 2021-07-26

## 2021-07-26 ENCOUNTER — OFFICE VISIT (OUTPATIENT)
Dept: FAMILY MEDICINE | Facility: CLINIC | Age: 26
End: 2021-07-26
Payer: COMMERCIAL

## 2021-07-26 ENCOUNTER — LAB VISIT (OUTPATIENT)
Dept: LAB | Facility: HOSPITAL | Age: 26
End: 2021-07-26
Payer: COMMERCIAL

## 2021-07-26 VITALS
TEMPERATURE: 99 F | HEART RATE: 98 BPM | DIASTOLIC BLOOD PRESSURE: 78 MMHG | WEIGHT: 159.75 LBS | BODY MASS INDEX: 25.07 KG/M2 | OXYGEN SATURATION: 96 % | HEIGHT: 67 IN | SYSTOLIC BLOOD PRESSURE: 110 MMHG

## 2021-07-26 DIAGNOSIS — Z00.00 ENCOUNTER FOR PREVENTIVE HEALTH EXAMINATION: Primary | ICD-10-CM

## 2021-07-26 DIAGNOSIS — F32.A ANXIETY AND DEPRESSION: ICD-10-CM

## 2021-07-26 DIAGNOSIS — F41.9 ANXIETY AND DEPRESSION: ICD-10-CM

## 2021-07-26 DIAGNOSIS — Z00.00 ENCOUNTER FOR PREVENTIVE HEALTH EXAMINATION: ICD-10-CM

## 2021-07-26 LAB
25(OH)D3+25(OH)D2 SERPL-MCNC: 28 NG/ML (ref 30–96)
ALBUMIN SERPL BCP-MCNC: 3.7 G/DL (ref 3.5–5.2)
ALP SERPL-CCNC: 43 U/L (ref 55–135)
ALT SERPL W/O P-5'-P-CCNC: 14 U/L (ref 10–44)
ANION GAP SERPL CALC-SCNC: 9 MMOL/L (ref 8–16)
AST SERPL-CCNC: 23 U/L (ref 10–40)
BASOPHILS # BLD AUTO: 0.02 K/UL (ref 0–0.2)
BASOPHILS NFR BLD: 0.3 % (ref 0–1.9)
BILIRUB SERPL-MCNC: 0.2 MG/DL (ref 0.1–1)
BUN SERPL-MCNC: 18 MG/DL (ref 6–20)
CALCIUM SERPL-MCNC: 9.2 MG/DL (ref 8.7–10.5)
CHLORIDE SERPL-SCNC: 106 MMOL/L (ref 95–110)
CHOLEST SERPL-MCNC: 185 MG/DL (ref 120–199)
CHOLEST/HDLC SERPL: 3.1 {RATIO} (ref 2–5)
CO2 SERPL-SCNC: 23 MMOL/L (ref 23–29)
CREAT SERPL-MCNC: 0.8 MG/DL (ref 0.5–1.4)
DIFFERENTIAL METHOD: ABNORMAL
EOSINOPHIL # BLD AUTO: 0.1 K/UL (ref 0–0.5)
EOSINOPHIL NFR BLD: 0.9 % (ref 0–8)
ERYTHROCYTE [DISTWIDTH] IN BLOOD BY AUTOMATED COUNT: 14 % (ref 11.5–14.5)
EST. GFR  (AFRICAN AMERICAN): >60 ML/MIN/1.73 M^2
EST. GFR  (NON AFRICAN AMERICAN): >60 ML/MIN/1.73 M^2
GLUCOSE SERPL-MCNC: 73 MG/DL (ref 70–110)
HCT VFR BLD AUTO: 35.1 % (ref 37–48.5)
HDLC SERPL-MCNC: 60 MG/DL (ref 40–75)
HDLC SERPL: 32.4 % (ref 20–50)
HGB BLD-MCNC: 11.1 G/DL (ref 12–16)
IMM GRANULOCYTES # BLD AUTO: 0.01 K/UL (ref 0–0.04)
IMM GRANULOCYTES NFR BLD AUTO: 0.2 % (ref 0–0.5)
LDLC SERPL CALC-MCNC: 114 MG/DL (ref 63–159)
LYMPHOCYTES # BLD AUTO: 2.2 K/UL (ref 1–4.8)
LYMPHOCYTES NFR BLD: 33.5 % (ref 18–48)
MCH RBC QN AUTO: 26.9 PG (ref 27–31)
MCHC RBC AUTO-ENTMCNC: 31.6 G/DL (ref 32–36)
MCV RBC AUTO: 85 FL (ref 82–98)
MONOCYTES # BLD AUTO: 0.4 K/UL (ref 0.3–1)
MONOCYTES NFR BLD: 6.5 % (ref 4–15)
NEUTROPHILS # BLD AUTO: 3.8 K/UL (ref 1.8–7.7)
NEUTROPHILS NFR BLD: 58.6 % (ref 38–73)
NONHDLC SERPL-MCNC: 125 MG/DL
NRBC BLD-RTO: 0 /100 WBC
PLATELET # BLD AUTO: 200 K/UL (ref 150–450)
PMV BLD AUTO: 11.8 FL (ref 9.2–12.9)
POTASSIUM SERPL-SCNC: 3.7 MMOL/L (ref 3.5–5.1)
PROT SERPL-MCNC: 7.4 G/DL (ref 6–8.4)
RBC # BLD AUTO: 4.12 M/UL (ref 4–5.4)
SODIUM SERPL-SCNC: 138 MMOL/L (ref 136–145)
TRIGL SERPL-MCNC: 55 MG/DL (ref 30–150)
TSH SERPL DL<=0.005 MIU/L-ACNC: 1.01 UIU/ML (ref 0.4–4)
WBC # BLD AUTO: 6.45 K/UL (ref 3.9–12.7)

## 2021-07-26 PROCEDURE — 84443 ASSAY THYROID STIM HORMONE: CPT | Performed by: INTERNAL MEDICINE

## 2021-07-26 PROCEDURE — 36415 COLL VENOUS BLD VENIPUNCTURE: CPT | Mod: PO | Performed by: INTERNAL MEDICINE

## 2021-07-26 PROCEDURE — 99395 PR PREVENTIVE VISIT,EST,18-39: ICD-10-PCS | Mod: S$GLB,,, | Performed by: INTERNAL MEDICINE

## 2021-07-26 PROCEDURE — 80053 COMPREHEN METABOLIC PANEL: CPT | Performed by: INTERNAL MEDICINE

## 2021-07-26 PROCEDURE — 99395 PREV VISIT EST AGE 18-39: CPT | Mod: S$GLB,,, | Performed by: INTERNAL MEDICINE

## 2021-07-26 PROCEDURE — 83036 HEMOGLOBIN GLYCOSYLATED A1C: CPT | Performed by: INTERNAL MEDICINE

## 2021-07-26 PROCEDURE — 99999 PR PBB SHADOW E&M-EST. PATIENT-LVL III: CPT | Mod: PBBFAC,,, | Performed by: INTERNAL MEDICINE

## 2021-07-26 PROCEDURE — 82306 VITAMIN D 25 HYDROXY: CPT | Performed by: INTERNAL MEDICINE

## 2021-07-26 PROCEDURE — 99999 PR PBB SHADOW E&M-EST. PATIENT-LVL III: ICD-10-PCS | Mod: PBBFAC,,, | Performed by: INTERNAL MEDICINE

## 2021-07-26 PROCEDURE — 80061 LIPID PANEL: CPT | Performed by: INTERNAL MEDICINE

## 2021-07-26 PROCEDURE — 85025 COMPLETE CBC W/AUTO DIFF WBC: CPT | Performed by: INTERNAL MEDICINE

## 2021-07-27 ENCOUNTER — PATIENT MESSAGE (OUTPATIENT)
Dept: FAMILY MEDICINE | Facility: CLINIC | Age: 26
End: 2021-07-27

## 2021-07-27 LAB
ESTIMATED AVG GLUCOSE: 103 MG/DL (ref 68–131)
HBA1C MFR BLD: 5.2 % (ref 4–5.6)

## 2021-08-25 ENCOUNTER — PATIENT MESSAGE (OUTPATIENT)
Dept: FAMILY MEDICINE | Facility: CLINIC | Age: 26
End: 2021-08-25

## 2021-10-19 ENCOUNTER — PATIENT MESSAGE (OUTPATIENT)
Dept: PSYCHIATRY | Facility: CLINIC | Age: 26
End: 2021-10-19

## 2021-10-19 RX ORDER — QUETIAPINE FUMARATE 100 MG/1
TABLET, FILM COATED ORAL
Qty: 30 TABLET | Refills: 2 | Status: SHIPPED | OUTPATIENT
Start: 2021-10-19

## 2021-10-19 RX ORDER — FLUOXETINE HYDROCHLORIDE 40 MG/1
40 CAPSULE ORAL DAILY
Qty: 30 CAPSULE | Refills: 2 | Status: SHIPPED | OUTPATIENT
Start: 2021-10-19

## 2022-05-02 ENCOUNTER — PATIENT MESSAGE (OUTPATIENT)
Dept: ADMINISTRATIVE | Facility: HOSPITAL | Age: 27
End: 2022-05-02

## 2022-07-14 ENCOUNTER — PATIENT OUTREACH (OUTPATIENT)
Dept: ADMINISTRATIVE | Facility: HOSPITAL | Age: 27
End: 2022-07-14